# Patient Record
Sex: MALE | Race: WHITE | NOT HISPANIC OR LATINO
[De-identification: names, ages, dates, MRNs, and addresses within clinical notes are randomized per-mention and may not be internally consistent; named-entity substitution may affect disease eponyms.]

---

## 2020-09-11 PROBLEM — Z00.00 ENCOUNTER FOR PREVENTIVE HEALTH EXAMINATION: Status: ACTIVE | Noted: 2020-09-11

## 2020-09-14 ENCOUNTER — APPOINTMENT (OUTPATIENT)
Dept: PEDIATRIC ALLERGY IMMUNOLOGY | Facility: CLINIC | Age: 60
End: 2020-09-14
Payer: COMMERCIAL

## 2020-09-14 PROCEDURE — 90471 IMMUNIZATION ADMIN: CPT

## 2020-09-14 PROCEDURE — 90682 RIV4 VACC RECOMBINANT DNA IM: CPT

## 2020-09-14 RX ORDER — MONTELUKAST 10 MG/1
10 TABLET, FILM COATED ORAL
Refills: 0 | Status: ACTIVE | COMMUNITY

## 2020-09-14 RX ORDER — FLUTICASONE PROPIONATE 50 MCG
50 SPRAY, SUSPENSION NASAL
Refills: 0 | Status: ACTIVE | COMMUNITY

## 2020-09-14 RX ORDER — ROSUVASTATIN CALCIUM 10 MG/1
10 TABLET, FILM COATED ORAL
Refills: 0 | Status: ACTIVE | COMMUNITY

## 2020-10-27 ENCOUNTER — RX RENEWAL (OUTPATIENT)
Age: 60
End: 2020-10-27

## 2020-11-05 ENCOUNTER — APPOINTMENT (OUTPATIENT)
Dept: PEDIATRIC ALLERGY IMMUNOLOGY | Facility: CLINIC | Age: 60
End: 2020-11-05
Payer: COMMERCIAL

## 2020-11-05 VITALS — RESPIRATION RATE: 16 BRPM | HEART RATE: 55 BPM | OXYGEN SATURATION: 97 %

## 2020-11-05 DIAGNOSIS — Z23 ENCOUNTER FOR IMMUNIZATION: ICD-10-CM

## 2020-11-05 PROCEDURE — 99214 OFFICE O/P EST MOD 30 MIN: CPT | Mod: 25

## 2020-11-05 PROCEDURE — 99072 ADDL SUPL MATRL&STAF TM PHE: CPT

## 2020-11-05 NOTE — ASSESSMENT
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 Pt also with moderate persistent asthma and GERD\par \par Pt would like to consider starting Cuvitru for recurrent infections, Low IgG and poor responses to Pneumovax especially during period of COVID pandemic\par \par Will await results of Pneumococcal test and then apply for insurance coverage for Cuvitru\par \par Will follow up 4 weeks

## 2020-11-05 NOTE — PHYSICAL EXAM
[Alert] : alert [Well Nourished] : well nourished [Normal Pupil & Iris Size/Symmetry] : normal pupil and iris size and symmetry [No Discharge] : no discharge [Normal TMs] : both tympanic membranes were normal [No Thrush] : no thrush [Boggy Nasal Turbinates] : no boggy and/or pale nasal turbinates [Posterior Pharyngeal Cobblestoning] : no posterior pharyngeal cobblestoning [Normal Rate and Effort] : normal respiratory rhythm and effort [No Crackles] : no crackles [Wheezing] : no wheezing was heard [Normal Rate] : heart rate was normal  [Normal S1, S2] : normal S1 and S2 [Regular Rhythm] : with a regular rhythm [Normal Cervical Lymph Nodes] : cervical [Skin Intact] : skin intact  [No Rash] : no rash [Eczematous Patches] : no eczematous patches

## 2020-11-05 NOTE — SOCIAL HISTORY
[Spouse/Partner] : spouse/partner [House] : [unfilled] lives in a house  [Central Forced Air] : heating provided by central forced air [Central] : air conditioning provided by central unit [Humidifier] : uses a humidifier [Dehumidifier] : uses a dehumidifier [Dry] : dry [Bedroom] :  in bedroom [Basement] :  in basement  [Living Area] : in living area [Dog] : dog [] :  [FreeTextEntry1] : ELIZABETH [FreeTextEntry2] : retired [Dust Mite Covers] : does not have dust mite covers [Feather Pillows] : does not have feather pillows [Feather Comforter] : does not have a feather comforter [Smokers in Household] : there are no smokers in the home [de-identified] : fitness

## 2020-11-05 NOTE — REVIEW OF SYSTEMS
[Fatigue] : fatigue [Nasal Congestion] : nasal congestion [Post Nasal Drip] : post nasal drip [Cough] : cough [Wheezing] : wheezing [Heartburn] : heartburn [Recurrent Sinus Infections] : recurrent sinus infections [Nl] : Integumentary

## 2020-11-05 NOTE — HISTORY OF PRESENT ILLNESS
[de-identified] : 60 yr old with CVID now here for follow up. Pt has several year history of very low levels of IgG in the 300 range and generally has not been sick over those years but recently has noticed a slight increase number ofd sinus infections to 2-3x/year usually treated with antibiotics.  He has underlying moderate persistent asthma and is using Advair 50/500 1p bid along with Singulair 10 mg and albuterol MDI prn. He was recently diagnosed with GERD and LPR and is currently on Pepcid and Protonix. He is occasionally complains of hoarseness and sore throat and part of his complaints may be related to GERD/LPR and side effects of dysphonia from Advair. He is becoming increasing concerned, as am I , of him walking around with IgG levels this low without replacement therapy despite mild infection rate. he previously did not want to consider IVIG or SQIG but is now interested. \par Pt was last given Pneumovax 23 12/15 and made a fair to poor response post vaccination. Since then pneumococcal titers have again dropped to pre-vaccination levels - current level just checked 10/20 is still pending

## 2020-11-05 NOTE — REASON FOR VISIT
[Routine Follow-Up] : a routine follow-up visit for [Allergy Evaluation/ Skin Testing] : allergy evaluation and or skin testing [To Food] : allergy to food [Asthma] : asthma [Recurrent Infect] : recurrent infections [Immune Evaluation] : immune evaluation

## 2020-11-11 ENCOUNTER — APPOINTMENT (OUTPATIENT)
Dept: OTOLARYNGOLOGY | Facility: CLINIC | Age: 60
End: 2020-11-11
Payer: COMMERCIAL

## 2020-11-11 VITALS
HEIGHT: 71 IN | DIASTOLIC BLOOD PRESSURE: 83 MMHG | SYSTOLIC BLOOD PRESSURE: 143 MMHG | WEIGHT: 170 LBS | BODY MASS INDEX: 23.8 KG/M2 | TEMPERATURE: 97.6 F

## 2020-11-11 DIAGNOSIS — Z88.9 ALLERGY STATUS TO UNSPECIFIED DRUGS, MEDICAMENTS AND BIOLOGICAL SUBSTANCES: ICD-10-CM

## 2020-11-11 DIAGNOSIS — Z87.09 PERSONAL HISTORY OF OTHER DISEASES OF THE RESPIRATORY SYSTEM: ICD-10-CM

## 2020-11-11 DIAGNOSIS — Z80.0 FAMILY HISTORY OF MALIGNANT NEOPLASM OF DIGESTIVE ORGANS: ICD-10-CM

## 2020-11-11 DIAGNOSIS — Z80.1 FAMILY HISTORY OF MALIGNANT NEOPLASM OF TRACHEA, BRONCHUS AND LUNG: ICD-10-CM

## 2020-11-11 DIAGNOSIS — Z78.9 OTHER SPECIFIED HEALTH STATUS: ICD-10-CM

## 2020-11-11 PROCEDURE — 99072 ADDL SUPL MATRL&STAF TM PHE: CPT

## 2020-11-11 PROCEDURE — 99243 OFF/OP CNSLTJ NEW/EST LOW 30: CPT | Mod: 25

## 2020-11-11 PROCEDURE — 31575 DIAGNOSTIC LARYNGOSCOPY: CPT

## 2020-11-11 RX ORDER — ALBUTEROL SULFATE 90 UG/1
108 (90 BASE) INHALANT RESPIRATORY (INHALATION)
Qty: 1 | Refills: 3 | Status: DISCONTINUED | COMMUNITY
End: 2020-11-11

## 2020-11-11 NOTE — REASON FOR VISIT
[Initial Consultation] : an initial consultation for [FreeTextEntry2] : hoarse throat/sensation of need to clean throat

## 2020-11-11 NOTE — HISTORY OF PRESENT ILLNESS
[de-identified] : The patient presents with mild hoarseness and throat clearing. He notes that he had a virus in the Spring that he didn't need abx for and his sx resolved but he still had continued hoarseness and throat clearing intermittently. He started getting heartburn as well. He was prescribed Pantoprazole by his PCP. He then saw the GI who double the dose of Pantoprazole and prescribed Pepcid with improvement. He feels the hoarseness and throat clearing is most prevalent after eating. He is scheduled for an upper GI endoscopy and colonoscopy in 1/2020.

## 2020-11-11 NOTE — PROCEDURE
[de-identified] : Procedure:  Flexible Fiberoptic Laryngoscopy: Risks, benefits, and alternatives of flexible endoscopy were explained to the patient.  Specific mention was made of the risk of throat numbness.  The patient gave oral consent to proceed.  The nasal cavities were decongested and anesthetized with a combination of oxymetazoline and 4% lidocaine solution.  The flexible scope was inserted into the right nasal cavity and advanced towards the nasopharynx.  Visualized mucosa over the turbinates and deviated septum were as described above.  The nasopharynx had hypervascularity. Lymphoid tissue. Oropharyngeal walls were symmetric and mobile without lesion, mass, or edema.  Hypopharynx was also without  lesion or edema.  Larynx was mobile without lesions. Postcricoid edema. Pooling postcricoid and pyriforms. True vocal folds were white without mass or lesion. VC's move normally. Base of tongue was within normal limits. Thick secretions and mucus throughout.

## 2020-11-11 NOTE — PHYSICAL EXAM
[Hearing Gordillo Test (Tuning Fork On Forehead)] : no lateralization of tone [] : septum deviated to the left [Normal] : no rashes [FreeTextEntry8] : cerumen removed via curettage  [FreeTextEntry9] : cerumen removed via curettage  [de-identified] : inflamed and swollen [FreeTextEntry1] : elongated uvula, white coating to the tongue - doesn't scrape off, leukoplakia on the cheeks right greater than left [FreeTextEntry2] : sinuses nontender to percussion [de-identified] : sensations intact

## 2020-11-11 NOTE — CONSULT LETTER
[Dear  ___] : Dear  [unfilled], [Consult Letter:] : I had the pleasure of evaluating your patient, [unfilled]. [Please see my note below.] : Please see my note below. [Consult Closing:] : Thank you very much for allowing me to participate in the care of this patient.  If you have any questions, please do not hesitate to contact me. [Sincerely,] : Sincerely, [DrJami  ___] : Dr. GONZALEZ

## 2020-11-11 NOTE — ASSESSMENT
[FreeTextEntry1] : hoarseness\par throat clearing\par postcricoid edema\par pooling postcricoid and pyriforms\par thick secretions and mucus throughout\par \par Plan:\par Cerumen removed. Flexible laryngoscopy. Reflux diet- no eating 2 hours before bed. Continue reflux medication. FEEST. FU after test.

## 2020-11-11 NOTE — ADDENDUM
[FreeTextEntry1] : Documented by Kacy Perez acting as scribe for Dr. Nguyen on 11/11/2020.\par \par All Medical record entries made by the Scribe were at my, Dr. Nguyen, direction and personally dictated by me on 11/11/2020 . I have reviewed the chart and agree that the record accurately reflects my personal performance of the history, physical exam, assessment and plan. I have also personally directed, reviewed, and agreed with the discharge instructions.

## 2020-11-15 ENCOUNTER — NON-APPOINTMENT (OUTPATIENT)
Age: 60
End: 2020-11-15

## 2020-11-24 ENCOUNTER — APPOINTMENT (OUTPATIENT)
Dept: OTOLARYNGOLOGY | Facility: CLINIC | Age: 60
End: 2020-11-24
Payer: COMMERCIAL

## 2020-11-24 PROCEDURE — 92612 ENDOSCOPY SWALLOW (FEES) VID: CPT | Mod: GN

## 2020-12-08 ENCOUNTER — APPOINTMENT (OUTPATIENT)
Dept: OTOLARYNGOLOGY | Facility: CLINIC | Age: 60
End: 2020-12-08
Payer: COMMERCIAL

## 2020-12-08 VITALS
TEMPERATURE: 97.5 F | DIASTOLIC BLOOD PRESSURE: 80 MMHG | BODY MASS INDEX: 23.8 KG/M2 | WEIGHT: 170 LBS | SYSTOLIC BLOOD PRESSURE: 140 MMHG | HEIGHT: 71 IN

## 2020-12-08 PROCEDURE — 99072 ADDL SUPL MATRL&STAF TM PHE: CPT

## 2020-12-08 PROCEDURE — 99214 OFFICE O/P EST MOD 30 MIN: CPT

## 2020-12-08 NOTE — HISTORY OF PRESENT ILLNESS
[de-identified] : The patient presents with h/o hoarseness, throat clearing and reflux. Patient has symptoms right after eating, talking on the phone for a long period of time. He is following the reflux diet and taking reflux medication.  Patient presents today for results. Denies dysphasia or globus sensation.

## 2020-12-08 NOTE — CONSULT LETTER
[Dear  ___] : Dear  [unfilled], [Courtesy Letter:] : I had the pleasure of seeing your patient, [unfilled], in my office today. [Please see my note below.] : Please see my note below. [Consult Closing:] : Thank you very much for allowing me to participate in the care of this patient.  If you have any questions, please do not hesitate to contact me. [Sincerely,] : Sincerely, [FreeTextEntry3] : Silvino Nguyen MD FACS

## 2020-12-08 NOTE — ASSESSMENT
[FreeTextEntry1] :  h/o hoarseness, throat clearing and reflux\par \par FEEST 11/24/2020: reflux was identified with each PO trial with return of green material to the level in the cricopharyngeus. \par \par Plan:  FU with GI for upper GI endoscopy. Advised to consume small bites/sips. Recommended speech therapy. Discussed r/b/a of Esophagram.  Continue reflux diet and medication.

## 2020-12-08 NOTE — ADDENDUM
[FreeTextEntry1] : Documented by Kacy Perez acting as scribe for Dr. Nguyen on 12/08/2020.\par \par All Medical record entries made by the Scribe were at my, Dr. Nguyen, direction and personally dictated by me on 12/08/2020 . I have reviewed the chart and agree that the record accurately reflects my personal performance of the history, physical exam, assessment and plan. I have also personally directed, reviewed, and agreed with the discharge instructions.

## 2020-12-23 ENCOUNTER — NON-APPOINTMENT (OUTPATIENT)
Age: 60
End: 2020-12-23

## 2020-12-24 ENCOUNTER — TRANSCRIPTION ENCOUNTER (OUTPATIENT)
Age: 60
End: 2020-12-24

## 2021-01-20 ENCOUNTER — APPOINTMENT (OUTPATIENT)
Dept: OTOLARYNGOLOGY | Facility: CLINIC | Age: 61
End: 2021-01-20
Payer: COMMERCIAL

## 2021-01-20 VITALS
TEMPERATURE: 97.2 F | HEIGHT: 71 IN | DIASTOLIC BLOOD PRESSURE: 73 MMHG | HEART RATE: 60 BPM | SYSTOLIC BLOOD PRESSURE: 138 MMHG | WEIGHT: 168 LBS | BODY MASS INDEX: 23.52 KG/M2

## 2021-01-20 PROCEDURE — 99072 ADDL SUPL MATRL&STAF TM PHE: CPT

## 2021-01-20 PROCEDURE — 99213 OFFICE O/P EST LOW 20 MIN: CPT

## 2021-01-20 RX ORDER — FEXOFENADINE HCL 180 MG
180 TABLET ORAL
Refills: 0 | Status: COMPLETED | COMMUNITY
End: 2021-01-20

## 2021-01-20 NOTE — ASSESSMENT
[FreeTextEntry1] : Reviewed and reconciled medications, allergies, PMHx, PSHx, SocHx, FMHx.\par \par h/o hoarseness, throat clearing and reflux - some improvement, continued hoarseness mostly in the morning and after eating and drinking\par \par Plan:\par Discussed r/b/a of Domperidone. Drink as you eat. Eat slowly. Continue reflux diet. Continue reflux medications. FU 3 months.

## 2021-01-20 NOTE — ADDENDUM
[FreeTextEntry1] : Documented by Kacy Perez acting as scribe for Dr. Nguyen on 01/20/2021.\par \par All Medical record entries made by the Scribe were at my, Dr. Nguyen, direction and personally dictated by me on 01/20/2021 . I have reviewed the chart and agree that the record accurately reflects my personal performance of the history, physical exam, assessment and plan. I have also personally directed, reviewed, and agreed with the discharge instructions.

## 2021-01-20 NOTE — HISTORY OF PRESENT ILLNESS
[de-identified] : The patient presents with h/o hoarseness, throat clearing and reflux. Pt states that his throat feels slightly better. He had an upper GI endoscopy and did a bx that were both negative. He found a polyp in the colon that was benign. He has been taking the medication and his heartburn is less frequent. He goes for a longer span of time with no sx. He is trying to follow the diet but is finding it hard. He has lost some weight recently. He is clearing his throat less frequently. When he is hoarse it's instantly after eating or drinking something and feels it's more so in the morning. He gets heartburn more at nighttime. He also notes hoarseness if he is doing a lot of talking. He is drinking a lot of water.

## 2021-02-08 ENCOUNTER — NON-APPOINTMENT (OUTPATIENT)
Age: 61
End: 2021-02-08

## 2021-03-11 ENCOUNTER — RX RENEWAL (OUTPATIENT)
Age: 61
End: 2021-03-11

## 2021-04-01 LAB
ALBUMIN SERPL ELPH-MCNC: 4.5 G/DL
ALP BLD-CCNC: 73 U/L
ALT SERPL-CCNC: 48 U/L
ANION GAP SERPL CALC-SCNC: 10 MMOL/L
AST SERPL-CCNC: 34 U/L
BASOPHILS # BLD AUTO: 0.01 K/UL
BASOPHILS NFR BLD AUTO: 0.2 %
BILIRUB SERPL-MCNC: 0.5 MG/DL
BUN SERPL-MCNC: 11 MG/DL
CALCIUM SERPL-MCNC: 9.5 MG/DL
CHLORIDE SERPL-SCNC: 101 MMOL/L
CO2 SERPL-SCNC: 29 MMOL/L
CREAT SERPL-MCNC: 0.96 MG/DL
EOSINOPHIL # BLD AUTO: 0.04 K/UL
EOSINOPHIL NFR BLD AUTO: 0.9 %
GLUCOSE SERPL-MCNC: 71 MG/DL
HCT VFR BLD CALC: 44.2 %
HGB BLD-MCNC: 14.1 G/DL
IMM GRANULOCYTES NFR BLD AUTO: 0.2 %
LYMPHOCYTES # BLD AUTO: 1.15 K/UL
LYMPHOCYTES NFR BLD AUTO: 25.3 %
MAN DIFF?: NORMAL
MCHC RBC-ENTMCNC: 31.1 PG
MCHC RBC-ENTMCNC: 31.9 GM/DL
MCV RBC AUTO: 97.6 FL
MONOCYTES # BLD AUTO: 0.58 K/UL
MONOCYTES NFR BLD AUTO: 12.8 %
NEUTROPHILS # BLD AUTO: 2.75 K/UL
NEUTROPHILS NFR BLD AUTO: 60.6 %
PLATELET # BLD AUTO: 124 K/UL
POTASSIUM SERPL-SCNC: 4.3 MMOL/L
PROT SERPL-MCNC: 6.7 G/DL
RBC # BLD: 4.53 M/UL
RBC # FLD: 12.2 %
SODIUM SERPL-SCNC: 140 MMOL/L
WBC # FLD AUTO: 4.54 K/UL

## 2021-04-03 LAB
DEPRECATED KAPPA LC FREE/LAMBDA SER: 0.92 RATIO
IGA SER QL IEP: 50 MG/DL
IGG SER QL IEP: 821 MG/DL
IGM SER QL IEP: 55 MG/DL
KAPPA LC CSF-MCNC: 0.64 MG/DL
KAPPA LC SERPL-MCNC: 0.59 MG/DL

## 2021-04-13 ENCOUNTER — APPOINTMENT (OUTPATIENT)
Dept: PEDIATRIC ALLERGY IMMUNOLOGY | Facility: CLINIC | Age: 61
End: 2021-04-13
Payer: COMMERCIAL

## 2021-04-13 VITALS — HEART RATE: 56 BPM | OXYGEN SATURATION: 97 % | RESPIRATION RATE: 16 BRPM

## 2021-04-13 PROCEDURE — 99214 OFFICE O/P EST MOD 30 MIN: CPT

## 2021-04-13 PROCEDURE — 99072 ADDL SUPL MATRL&STAF TM PHE: CPT

## 2021-04-13 NOTE — ASSESSMENT
[FreeTextEntry1] : 61 yr old with CVID now on Hizentra for past 5 infusions with nice response and IgG increase from 347 to 821 without signficant side effects\par \par Discussed all issues related to infusions\par Discussed responses to COVID vaccines in CVID patients - what little is known\par Discussed various protocols for returning to social interactions\par \par Will follow up 3-4 months\par \par \par \par Total MD time spent on this encounter was 37 minutes.  This includes time devoted to preparing to see the patient with review of previous medical record, obtaining medical history, performing physical exam, counseling and patient education with patient and family, ordering medications and lab studies, documentation in the medical record and coordination of care.\par

## 2021-04-13 NOTE — SOCIAL HISTORY
[Spouse/Partner] : spouse/partner [House] : [unfilled] lives in a house  [Central Forced Air] : heating provided by central forced air [Central] : air conditioning provided by central unit [Humidifier] : uses a humidifier [Dehumidifier] : uses a dehumidifier [Dry] : dry [Bedroom] :  in bedroom [Basement] :  in basement  [Living Area] : in living area [Dog] : dog [] :  [FreeTextEntry1] : ELIZABETH [FreeTextEntry2] : retired [Dust Mite Covers] : does not have dust mite covers [Feather Pillows] : does not have feather pillows [Feather Comforter] : does not have a feather comforter [Smokers in Household] : there are no smokers in the home [de-identified] : fitness

## 2021-04-13 NOTE — HISTORY OF PRESENT ILLNESS
[de-identified] : 60 yr old with CVID now here for follow up. Pt had several year history of very low levels of IgG in the 300 range and generally has not been sick over those years but recently had noticed a slight increase number ofd sinus infections to 2-3x/year usually treated with antibiotics.  He has underlying moderate persistent asthma and is using Advair 50/500 1p bid along with Singulair 10 mg and albuterol MDI prn. He was recently diagnosed with GERD and LPR and is currently on Pepcid and Protonix.   \par Pt was last given Pneumovax 23 12/15 and made a fair to poor response post vaccination. Since then pneumococcal titers have again dropped to pre-vaccination levels\par After much deliberation he was stated on Hizentra 15 gms q 2 weeks SQ (400 mg/kg per month)- He is now doing the infusions himself-receives 75 ml dividend into 3 sites that takes 1-1.5 hrs to infuse. He is tolerating very well.\par He is now feeling better with more energy. IgG levels have climbed form 347 now to trough of 821 with IgA 50. Normal CBC except for minimal low PLT count which has been low for awhile.\par He received two COVID vaccines and has many questions about continuing his quarantine.

## 2021-04-20 ENCOUNTER — APPOINTMENT (OUTPATIENT)
Dept: OTOLARYNGOLOGY | Facility: CLINIC | Age: 61
End: 2021-04-20
Payer: COMMERCIAL

## 2021-04-20 VITALS
HEART RATE: 64 BPM | BODY MASS INDEX: 23.1 KG/M2 | DIASTOLIC BLOOD PRESSURE: 78 MMHG | SYSTOLIC BLOOD PRESSURE: 140 MMHG | TEMPERATURE: 97.1 F | HEIGHT: 71 IN | WEIGHT: 165 LBS

## 2021-04-20 PROCEDURE — 99213 OFFICE O/P EST LOW 20 MIN: CPT | Mod: 25

## 2021-04-20 PROCEDURE — 31575 DIAGNOSTIC LARYNGOSCOPY: CPT

## 2021-04-20 PROCEDURE — 99072 ADDL SUPL MATRL&STAF TM PHE: CPT

## 2021-04-20 RX ORDER — ROSUVASTATIN CALCIUM 5 MG/1
5 TABLET, FILM COATED ORAL
Refills: 0 | Status: DISCONTINUED | COMMUNITY
End: 2021-04-20

## 2021-04-20 RX ORDER — PANTOPRAZOLE SODIUM 40 MG/1
40 TABLET, DELAYED RELEASE ORAL
Refills: 0 | Status: COMPLETED | COMMUNITY
End: 2021-04-20

## 2021-04-20 NOTE — PHYSICAL EXAM
[Normal] : no rashes [FreeTextEntry1] : mucus on the uvula, PND, leukoplakia on the cheeks [FreeTextEntry2] : sinuses nontender to percussion [de-identified] : sensations intact

## 2021-04-20 NOTE — PROCEDURE
[de-identified] : Procedure:  Flexible Fiberoptic Laryngoscopy: Risks, benefits, and alternatives of flexible endoscopy were explained to the patient.  The patient gave oral consent to proceed.  The flexible scope was inserted into the right nasal cavity and advanced towards the nasopharynx.  Visualized mucosa over the turbinates and septum were as described above.  The nasopharynx was clear.  Oropharyngeal walls were symmetric and mobile without lesion, mass, or edema.  Hypopharynx was also without  lesion or edema.  Larynx was mobile without lesions. Improvement - much less swelling and redness. No pooling. True vocal folds were white without mass or lesion.  Base of tongue was within normal limits.

## 2021-04-20 NOTE — CONSULT LETTER
[Courtesy Letter:] : I had the pleasure of seeing your patient, [unfilled], in my office today. [Please see my note below.] : Please see my note below. [Consult Closing:] : Thank you very much for allowing me to participate in the care of this patient.  If you have any questions, please do not hesitate to contact me. [Sincerely,] : Sincerely, [Dear  ___] : Dear  [unfilled], [FreeTextEntry3] : Silvino Nguyen MD FACS

## 2021-04-20 NOTE — ASSESSMENT
[FreeTextEntry1] : Reviewed and reconciled medications, allergies, PMHx, PSHx, SocHx, FMHx.\par \par h/o hoarseness, throat clearing and reflux - improvement, much less swelling and redness\par \par Plan:\par Flexible laryngoscopy. Continue Protonix and Pepcid. Continue reflux diet. FU 3-4 months.

## 2021-04-20 NOTE — ADDENDUM
[FreeTextEntry1] : Documented by Kacy Perez acting as scribe for Dr. Nguyen on 04/20/2021.\par \par All Medical record entries made by the Scribe were at my, Dr. Nguyen, direction and personally dictated by me on 04/20/2021 . I have reviewed the chart and agree that the record accurately reflects my personal performance of the history, physical exam, assessment and plan. I have also personally directed, reviewed, and agreed with the discharge instructions.

## 2021-04-20 NOTE — HISTORY OF PRESENT ILLNESS
[de-identified] : The patient presents with h/o hoarseness, throat clearing and reflux. Patient reports doing well with reflux tx. Pt cut back is dose of Protonix and d/c Pepcid and was doing well but his sx returned with cheating on the diet.

## 2021-05-17 ENCOUNTER — RX RENEWAL (OUTPATIENT)
Age: 61
End: 2021-05-17

## 2021-06-28 LAB
ALBUMIN SERPL ELPH-MCNC: 4.3 G/DL
ALP BLD-CCNC: 72 U/L
ALT SERPL-CCNC: 54 U/L
ANION GAP SERPL CALC-SCNC: 9 MMOL/L
AST SERPL-CCNC: 37 U/L
BASOPHILS # BLD AUTO: 0.02 K/UL
BASOPHILS NFR BLD AUTO: 0.4 %
BILIRUB SERPL-MCNC: 0.8 MG/DL
BUN SERPL-MCNC: 15 MG/DL
CALCIUM SERPL-MCNC: 9.7 MG/DL
CHLORIDE SERPL-SCNC: 100 MMOL/L
CO2 SERPL-SCNC: 30 MMOL/L
CREAT SERPL-MCNC: 0.99 MG/DL
DEPRECATED KAPPA LC FREE/LAMBDA SER: 1.82 RATIO
EOSINOPHIL # BLD AUTO: 0.03 K/UL
EOSINOPHIL NFR BLD AUTO: 0.6 %
GLUCOSE SERPL-MCNC: 123 MG/DL
HCT VFR BLD CALC: 42.9 %
HGB BLD-MCNC: 14 G/DL
IGA SER QL IEP: 46 MG/DL
IGG SER QL IEP: 888 MG/DL
IGM SER QL IEP: 48 MG/DL
IMM GRANULOCYTES NFR BLD AUTO: 0.2 %
KAPPA LC CSF-MCNC: 0.34 MG/DL
KAPPA LC SERPL-MCNC: 0.62 MG/DL
LYMPHOCYTES # BLD AUTO: 1.42 K/UL
LYMPHOCYTES NFR BLD AUTO: 27.2 %
MAN DIFF?: NORMAL
MCHC RBC-ENTMCNC: 31.6 PG
MCHC RBC-ENTMCNC: 32.6 GM/DL
MCV RBC AUTO: 96.8 FL
MONOCYTES # BLD AUTO: 0.48 K/UL
MONOCYTES NFR BLD AUTO: 9.2 %
NEUTROPHILS # BLD AUTO: 3.26 K/UL
NEUTROPHILS NFR BLD AUTO: 62.4 %
PLATELET # BLD AUTO: 131 K/UL
POTASSIUM SERPL-SCNC: 4.6 MMOL/L
PROT SERPL-MCNC: 6.5 G/DL
RBC # BLD: 4.43 M/UL
RBC # FLD: 12.2 %
SODIUM SERPL-SCNC: 139 MMOL/L
WBC # FLD AUTO: 5.22 K/UL

## 2021-06-30 ENCOUNTER — NON-APPOINTMENT (OUTPATIENT)
Age: 61
End: 2021-06-30

## 2021-07-06 ENCOUNTER — APPOINTMENT (OUTPATIENT)
Dept: PEDIATRIC ALLERGY IMMUNOLOGY | Facility: CLINIC | Age: 61
End: 2021-07-06
Payer: COMMERCIAL

## 2021-07-06 PROCEDURE — 99072 ADDL SUPL MATRL&STAF TM PHE: CPT

## 2021-07-06 PROCEDURE — 99214 OFFICE O/P EST MOD 30 MIN: CPT

## 2021-07-06 NOTE — HISTORY OF PRESENT ILLNESS
[de-identified] : 61 yr old with CVID now here for follow up. Pt had several year history of very low levels of IgG in the 300 range and generally has not been sick over those years but recently had noticed a slight increase number ofd sinus infections to 2-3x/year usually treated with antibiotics.  He has underlying moderate persistent asthma and is using Advair 50/500 1p bid along with Singulair 10 mg and albuterol MDI prn. He was recently diagnosed with GERD and LPR and is currently on Pepcid and Protonix.   \par \par After much deliberation he was stated on Hizentra 15 gms q 2 weeks SQ (400 mg/kg per month)- He is now doing the infusions himself-receives 75 ml dividend into 3 sites that takes 1 -hrs to infuse. He is tolerating very well. He has been on infusions since Jan now for 6 months\par He is now feeling better with more energy. IgG levels have climbed form 347 now to trough of 888 with IgA 46. Normal CBC except for minimal low PLT count which has been low for awhile.\par He received two COVID vaccines - his primary care MD did recently check his COVID spike Ab (Roche) and he had high protective levels >250\par He is feeling great and has more energy\par He remains on his Singulair, Advair and Protonix

## 2021-07-06 NOTE — PHYSICAL EXAM
[Alert] : alert [Well Nourished] : well nourished [No Discharge] : no discharge [Normal TMs] : both tympanic membranes were normal [No Thrush] : no thrush [Boggy Nasal Turbinates] : no boggy and/or pale nasal turbinates [Posterior Pharyngeal Cobblestoning] : no posterior pharyngeal cobblestoning [Normal Rate and Effort] : normal respiratory rhythm and effort [Wheezing] : no wheezing was heard [Normal Rate] : heart rate was normal  [Normal Cervical Lymph Nodes] : cervical

## 2021-07-06 NOTE — ASSESSMENT
[FreeTextEntry1] : 61 yr old with CVID on Hizentra 15 gms q 2weeks with good IgG trough levels >800 with no infections or antibiotic use - Good COVID vaccine response (likely his and not in SQIg product) to mRNA vaccine\par \par Suggest continuation of Hizentra at current interval for now\par Discuss relaxation of mask use when around small groups of people who are all vaccinated\par \par Follow up 6 months\par \par Total MD time spent on this encounter was 30 minutes.  This includes time devoted to preparing to see the patient with review of previous medical record, obtaining medical history, performing physical exam, counseling and patient education with patient and family, ordering medications and lab studies, documentation in the medical record and coordination of care.\par

## 2021-08-10 ENCOUNTER — NON-APPOINTMENT (OUTPATIENT)
Age: 61
End: 2021-08-10

## 2021-09-01 ENCOUNTER — APPOINTMENT (OUTPATIENT)
Dept: OTOLARYNGOLOGY | Facility: CLINIC | Age: 61
End: 2021-09-01
Payer: COMMERCIAL

## 2021-09-01 VITALS
HEART RATE: 52 BPM | DIASTOLIC BLOOD PRESSURE: 80 MMHG | BODY MASS INDEX: 23.1 KG/M2 | HEIGHT: 71 IN | WEIGHT: 165 LBS | SYSTOLIC BLOOD PRESSURE: 138 MMHG

## 2021-09-01 PROCEDURE — 99214 OFFICE O/P EST MOD 30 MIN: CPT | Mod: 25

## 2021-09-01 PROCEDURE — 31575 DIAGNOSTIC LARYNGOSCOPY: CPT

## 2021-09-01 RX ORDER — FAMOTIDINE 20 MG/1
20 TABLET, FILM COATED ORAL
Refills: 0 | Status: DISCONTINUED | COMMUNITY
End: 2021-09-01

## 2021-09-01 RX ORDER — PANTOPRAZOLE 20 MG/1
20 TABLET, DELAYED RELEASE ORAL
Refills: 0 | Status: DISCONTINUED | COMMUNITY
End: 2021-09-01

## 2021-09-01 NOTE — HISTORY OF PRESENT ILLNESS
[de-identified] : The patient presents with h/o hoarseness, throat clearing and reflux. Pt last seen on 4/20. Pt sees allergist Dr. Weir. Pt reports his throat's doing better, but still experiencing heartburn 1-2x per month. Pt notes experiencing stress which worsens symptoms. Pt adds no voice impairment. Pt states he takes a calcium supplement, has previously taken Pepcid.

## 2021-09-01 NOTE — PROCEDURE
[FreeTextEntry6] : Procedure: Flexible Fiberoptic Laryngoscopy: Risk, benefits and alternatives of flexible endoscopy were explained to the patient. The patient gave oral consent to proceed. The flexible scope was inserted into the right nasal cavity and advanced towards the nasopharynx. Visualized mucosa over the turbinates and septum were as described above. The nasopharynx was clear. Oropharyngeal walls were symmetric and mobile without lesion, mass or edema. Hypopharynx was also without lesion or edema. Larynx was mobile without lesions. Mild edema of postcricoid region. Arytenoid region showed mild erythema and swelling. Supraglottic structures were free of mass and asymmetry. True vocal folds were white without mass or lesion. Base of tongue was within normal limits.

## 2021-09-01 NOTE — PHYSICAL EXAM
[] : septum deviated to the left [Midline] : trachea is located in midline position [Clear / Open well] : hypopharynx is clear and opens well [Normal] : no masses and lesions seen, face is symmetric [FreeTextEntry6] : Cerumen removed via curettage  [FreeTextEntry7] : Cerumen removed via curettage  [FreeTextEntry5] : No lateralization to tuning forks.  [FreeTextEntry1] : elongated uvula, geographic tongue. [FreeTextEntry2] : Sinuses nontender to percussion. sensations intact.

## 2021-09-01 NOTE — ASSESSMENT
[FreeTextEntry1] : Reviewed and reconciled medications, allergies, PMHx, PSHx, SocHx, FMHx. \par \par Right and left ear - Cerumen removed via curettage \par Sinuses nontender to percussion. sensations intact. \par No lateralization to tuning forks. \par Left deviated septum\par Mildly inflamed turbinates\par \par Plan: Discussed taking Pepcid 20mg prn before certain meals. Continue reflux diet. Take reflux med Protonix 20mg or 40 mg QAM, pre-breakfast. FU 6 months.

## 2021-09-01 NOTE — ADDENDUM
[FreeTextEntry1] : Documented by Skinny Shook acting as a scribe for Dr. Silvino Nguyen on (09/01/2021).\par \par All medical record entries made by the Scribe were at my, Dr. Silvino Nguyen's, direction and personally dictated by me on (09/01/2021). I have reviewed the chart and agree that the record accurately reflects my personal performance of the history, physical exam, assessment and plan. I have also personally directed, reviewed, and agree with the discharge instructions.

## 2021-09-30 ENCOUNTER — RX RENEWAL (OUTPATIENT)
Age: 61
End: 2021-09-30

## 2021-10-12 ENCOUNTER — APPOINTMENT (OUTPATIENT)
Dept: PEDIATRIC ALLERGY IMMUNOLOGY | Facility: CLINIC | Age: 61
End: 2021-10-12
Payer: COMMERCIAL

## 2021-10-12 VITALS — HEART RATE: 57 BPM | RESPIRATION RATE: 16 BRPM | OXYGEN SATURATION: 94 % | TEMPERATURE: 98.7 F

## 2021-10-12 PROCEDURE — 90471 IMMUNIZATION ADMIN: CPT

## 2021-10-12 PROCEDURE — 90682 RIV4 VACC RECOMBINANT DNA IM: CPT

## 2021-10-25 ENCOUNTER — NON-APPOINTMENT (OUTPATIENT)
Age: 61
End: 2021-10-25

## 2021-12-15 ENCOUNTER — RX RENEWAL (OUTPATIENT)
Age: 61
End: 2021-12-15

## 2021-12-15 RX ORDER — PANTOPRAZOLE 20 MG/1
20 TABLET, DELAYED RELEASE ORAL
Qty: 90 | Refills: 3 | Status: ACTIVE | COMMUNITY
Start: 2021-09-01 | End: 1900-01-01

## 2021-12-16 ENCOUNTER — RX RENEWAL (OUTPATIENT)
Age: 61
End: 2021-12-16

## 2022-01-17 ENCOUNTER — RX RENEWAL (OUTPATIENT)
Age: 62
End: 2022-01-17

## 2022-02-02 LAB
ALBUMIN SERPL ELPH-MCNC: 4.4 G/DL
ALP BLD-CCNC: 64 U/L
ALT SERPL-CCNC: 29 U/L
ANION GAP SERPL CALC-SCNC: 11 MMOL/L
AST SERPL-CCNC: 29 U/L
BASOPHILS # BLD AUTO: 0.02 K/UL
BASOPHILS NFR BLD AUTO: 0.4 %
BILIRUB SERPL-MCNC: 0.5 MG/DL
BUN SERPL-MCNC: 13 MG/DL
CALCIUM SERPL-MCNC: 9.4 MG/DL
CHLORIDE SERPL-SCNC: 100 MMOL/L
CO2 SERPL-SCNC: 29 MMOL/L
CREAT SERPL-MCNC: 0.96 MG/DL
EOSINOPHIL # BLD AUTO: 0.04 K/UL
EOSINOPHIL NFR BLD AUTO: 0.8 %
GLUCOSE SERPL-MCNC: 94 MG/DL
HCT VFR BLD CALC: 43 %
HGB BLD-MCNC: 13.8 G/DL
IMM GRANULOCYTES NFR BLD AUTO: 0.2 %
LYMPHOCYTES # BLD AUTO: 1.23 K/UL
LYMPHOCYTES NFR BLD AUTO: 25.4 %
MAN DIFF?: NORMAL
MCHC RBC-ENTMCNC: 30.7 PG
MCHC RBC-ENTMCNC: 32.1 GM/DL
MCV RBC AUTO: 95.6 FL
MONOCYTES # BLD AUTO: 0.52 K/UL
MONOCYTES NFR BLD AUTO: 10.7 %
NEUTROPHILS # BLD AUTO: 3.03 K/UL
NEUTROPHILS NFR BLD AUTO: 62.5 %
PLATELET # BLD AUTO: 134 K/UL
POTASSIUM SERPL-SCNC: 4.2 MMOL/L
PROT SERPL-MCNC: 6.4 G/DL
RBC # BLD: 4.5 M/UL
RBC # FLD: 12.6 %
SODIUM SERPL-SCNC: 140 MMOL/L
WBC # FLD AUTO: 4.85 K/UL

## 2022-02-03 LAB
DEPRECATED KAPPA LC FREE/LAMBDA SER: 2.03 RATIO
IGA SER QL IEP: 41 MG/DL
IGG SER QL IEP: 979 MG/DL
IGM SER QL IEP: 41 MG/DL
KAPPA LC CSF-MCNC: 0.31 MG/DL
KAPPA LC SERPL-MCNC: 0.63 MG/DL

## 2022-02-06 ENCOUNTER — NON-APPOINTMENT (OUTPATIENT)
Age: 62
End: 2022-02-06

## 2022-02-15 ENCOUNTER — APPOINTMENT (OUTPATIENT)
Dept: PEDIATRIC ALLERGY IMMUNOLOGY | Facility: CLINIC | Age: 62
End: 2022-02-15
Payer: COMMERCIAL

## 2022-02-15 VITALS
OXYGEN SATURATION: 99 % | BODY MASS INDEX: 24.27 KG/M2 | WEIGHT: 174 LBS | DIASTOLIC BLOOD PRESSURE: 72 MMHG | TEMPERATURE: 97.4 F | HEART RATE: 52 BPM | SYSTOLIC BLOOD PRESSURE: 137 MMHG | RESPIRATION RATE: 16 BRPM

## 2022-02-15 PROCEDURE — 99214 OFFICE O/P EST MOD 30 MIN: CPT

## 2022-02-15 NOTE — REVIEW OF SYSTEMS
[Recurrent Sinus Infections] : recurrent sinus infections [Recurrent Throat Infections] : no recurrence of throat infections [Recurrent Bronchitis] : no recurrent bronchitis [Recurrent Ear Infections] : no recurrence or ear infections [Recurrent Skin Infections] : no recurrent skin infections [Recurrent Pneumonia] : no ~T recurrent pneumonia [Nl] : Integumentary

## 2022-02-15 NOTE — HISTORY OF PRESENT ILLNESS
[de-identified] : 61 yr old with CVID now here for follow up. Pt had several year history of very low levels of IgG in the 300 range and generally has not been sick over those years but recently had noticed a slight increase number ofd sinus infections to 2-3x/year usually treated with antibiotics.  He has underlying moderate persistent asthma and is using Advair 50/500 1p bid along with Singulair 10 mg and albuterol MDI prn. He was recently diagnosed with GERD and LPR and is currently on Pepcid and Protonix.   \par \par After much deliberation he was stated on Hizentra 15 gms q 2 weeks SQ (400 mg/kg per month)- He is now doing the infusions himself-receives 75 ml dividend into 3 sites that takes 1 -hrs to infuse. He is tolerating very well. He has been on infusions since Jan now for 6 months\par He is now feeling better with more energy. IgG levels have climbed form 347 now to trough of 888 with IgA 46. Normal CBC except for minimal low PLT count which has been low for awhile.\par He received two COVID vaccines - his primary care MD did recently check his COVID spike Ab (Roche) and he had high protective levels >250\par \par He remains on his Singulair, Advair and Protonix \par \par He is now here for follow up and doing very well. Trough IgG are now at 979 - has had two sinus infections last years - both needed Levaquin and short courses of oral steroids 8/21 and 10/21 but other wise feels well\par No side effects of Hizentra

## 2022-02-15 NOTE — ASSESSMENT
[FreeTextEntry1] : 61 yr old with CVID doing very well on Hizentra at 15 gms q2 weeks with two recent sinus infections.\par Will continue to observe and consider increase in Hizentra dose if more infections are noted this late winter and spring\par \par Discuss CVID in general with patient and discuss use of sQIG\par \par Will follow up 6 months\par \par Total MD time spent on this encounter was 30 minutes.  This includes time devoted to preparing to see the patient with review of previous medical record, obtaining medical history, performing physical exam, counseling and patient education with patient and family, ordering medications and lab studies, documentation in the medical record and coordination of care.\par

## 2022-02-15 NOTE — SOCIAL HISTORY
[Spouse/Partner] : spouse/partner [House] : [unfilled] lives in a house  [Central Forced Air] : heating provided by central forced air [Central] : air conditioning provided by central unit [Humidifier] : uses a humidifier [Dehumidifier] : uses a dehumidifier [Dry] : dry [Bedroom] :  in bedroom [Basement] :  in basement  [Living Area] : in living area [Dog] : dog [] :  [FreeTextEntry1] : ELIZABETH [FreeTextEntry2] : retired [Dust Mite Covers] : does not have dust mite covers [Feather Pillows] : does not have feather pillows [Feather Comforter] : does not have a feather comforter [Smokers in Household] : there are no smokers in the home [de-identified] : fitness

## 2022-03-01 ENCOUNTER — RESULT REVIEW (OUTPATIENT)
Age: 62
End: 2022-03-01

## 2022-03-01 ENCOUNTER — APPOINTMENT (OUTPATIENT)
Dept: OTOLARYNGOLOGY | Facility: CLINIC | Age: 62
End: 2022-03-01
Payer: COMMERCIAL

## 2022-03-01 VITALS
SYSTOLIC BLOOD PRESSURE: 140 MMHG | WEIGHT: 165 LBS | DIASTOLIC BLOOD PRESSURE: 83 MMHG | HEIGHT: 71 IN | BODY MASS INDEX: 23.1 KG/M2 | HEART RATE: 48 BPM

## 2022-03-01 PROCEDURE — 70486 CT MAXILLOFACIAL W/O DYE: CPT

## 2022-03-01 PROCEDURE — 31231 NASAL ENDOSCOPY DX: CPT

## 2022-03-01 PROCEDURE — 99214 OFFICE O/P EST MOD 30 MIN: CPT | Mod: 25

## 2022-03-01 NOTE — HISTORY OF PRESENT ILLNESS
[de-identified] : The patient presents with h/o hoarseness, throat clearing and reflux- on Pantoprazole and reflux diet. Pt presents today for a follow up on his reflux and discuss his sinus symptoms. Pt has had no heartburn and very slight LPR in the morning but dissipates soon afterwards. However, pt reports that the LPR does not cause him any discomfort. Adds that whatever the first thing he drinks or eats in the morning usually does come back up. Pt additionally reports that two times in 3 months during the fall, he had sinus inflammation. Pt usually takes an oral steroid which resolves the inflammation. Pt reports that prior CT sinus showed polyps in his sinuses.

## 2022-03-01 NOTE — PHYSICAL EXAM
[Hearing Gordillo Test (Tuning Fork On Forehead)] : no lateralization of tone [Midline] : trachea located in midline position [Normal] : no masses and lesions seen, face is symmetric [FreeTextEntry8] : Cerumen removed via curettage.  [FreeTextEntry1] : minimal deviated septum- R > L. inflamed turbinates [de-identified] : geographic tongue  [de-identified] : very elongated uvula laying on the back of the tongue [FreeTextEntry2] : Sinuses nontender to percussion. Sensations intact.

## 2022-03-01 NOTE — PROCEDURE
[Recalcitrant Symptoms] : recalcitrant symptoms  [Anterior rhinoscopy insufficient to account for symptoms] : anterior rhinoscopy insufficient to account for symptoms [None] : none [Flexible Endoscope] : examined with the flexible endoscope [FreeTextEntry6] : Procedure: Flexible Nasal Endoscopy with a Look at the Larynx: Risks, benefits, and alternatives of flexible endoscopy were explained to the patient. The patient gave oral consent to proceed. The flexible scope was inserted into the left nasal cavity, then the right nasal cavity. Endoscopy of the inferior and middle meatus was performed. Left side with superiorly deviated septum, large middle turb. Right side with deviated septum, open maxillary sinus ostium.  No polyp, mass, or lesion was appreciated. Olfactory cleft was clear. Spheno-ethmoid recess clear. Nasopharynx was clear. Turbinates were without mass. Oropharyngeal walls were symmetric and mobile without lesion, mass, or edema. Very, elongated uvula. Hypopharynx was also without lesion or edema. Larynx was mobile without lesions. Mild post cricoid, intra arytenoid arytenoid edema but no erythema.  True vocal folds were white without mass or lesion. Base of tongue was within normal limits. The patient tolerated the procedure well.

## 2022-03-01 NOTE — ASSESSMENT
[FreeTextEntry1] : Reviewed and reconciled medications, allergies, PMHx, PSHx, SocHx, FMHx. \par \par h/o hoarseness, throat clearing and reflux- on Pantoprazole and reflux diet\par acute recurrent sinusitis\par Very, elongated uvula\par Mild post cricoid, intra arytenoid arytenoid edema but no erythema\par Left side with superiorly deviated septum, large middle turb\par Right side with deviated septum\par \par Mini CT sinus: small polyp in left sphenoid. deviated septum to right. minimal thickening in the ethmoids. thickening in floor of left maxillary sinus. turbs slightly larger on left than right. \par \par Plan:\par Flexible Nasal Endoscopy with a Look at the Larynx. Cerumen removed. Mini CT sinus - interpreted by  Saline gel spray. Steam in the shower. Wendy and reviewed with the patient, pending radiologist review. KEVIN smithn.

## 2022-03-01 NOTE — ADDENDUM
[FreeTextEntry1] : Documented by John Grewal acting as scribe for Dr. Nguyen on 03/01/2022.\par \par All Medical record entries made by the Scribe were at my, Dr. Nguyen, direction and personally dictated by me on 03/01/2022. I have reviewed the chart and agree that the record accurately reflects my personal performance of the history, physical exam, assessment and plan. I have also personally directed, reviewed, and agreed with the discharge instructions.

## 2022-03-14 ENCOUNTER — NON-APPOINTMENT (OUTPATIENT)
Age: 62
End: 2022-03-14

## 2022-05-03 ENCOUNTER — APPOINTMENT (OUTPATIENT)
Dept: OTOLARYNGOLOGY | Facility: CLINIC | Age: 62
End: 2022-05-03
Payer: COMMERCIAL

## 2022-05-03 VITALS
SYSTOLIC BLOOD PRESSURE: 132 MMHG | HEART RATE: 61 BPM | HEIGHT: 71 IN | DIASTOLIC BLOOD PRESSURE: 82 MMHG | BODY MASS INDEX: 22.4 KG/M2 | WEIGHT: 160 LBS

## 2022-05-03 DIAGNOSIS — R07.0 PAIN IN THROAT: ICD-10-CM

## 2022-05-03 PROCEDURE — 31575 DIAGNOSTIC LARYNGOSCOPY: CPT

## 2022-05-03 PROCEDURE — 99213 OFFICE O/P EST LOW 20 MIN: CPT | Mod: 25

## 2022-05-03 RX ORDER — HUMAN IMMUNOGLOBULIN G 0.2 G/ML
LIQUID SUBCUTANEOUS
Refills: 0 | Status: DISCONTINUED | COMMUNITY
End: 2022-05-03

## 2022-05-03 NOTE — HISTORY OF PRESENT ILLNESS
[de-identified] : The patient presents with h/o hoarseness, throat clearing and reflux- on Pantoprazole and Pepcid, and reflux diet. Pt presents today c/o throat pain, worsened yesterday. Pt states that his LPR returned about a month ago due to cheating on the diet. Pt is not using Albuterol frequently, but has been using Advair. Denies any other symptoms such as fever.

## 2022-05-03 NOTE — ASSESSMENT
[FreeTextEntry1] : Reviewed and reconciled medications, allergies, PMHx, PSHx, SocHx, FMHx. \par \par h/o hoarseness, throat clearing and reflux- on Pantoprazole and Pepcid, and reflux diet\par throat pain\par \par FFL findings: Base of tongue was with some minimal lymphoid tissue. Mild edema of arytenoid, interarytenoid, and post cricoid. No pooling, masses or growths. \par \par Plan:\par Cerumen removed. Flexible Fiberoptic Laryngoscopy. Rinse well after use of Advair. If symptoms worsen then Azithromycin- 1 tablet daily.

## 2022-05-03 NOTE — ADDENDUM
[FreeTextEntry1] : Documented by John Grewal acting as scribe for Dr. Nguyen on 05/03/2022.\par \par All Medical record entries made by the Scribe were at my, Dr. Nguyen, direction and personally dictated by me on 05/03/2022. I have reviewed the chart and agree that the record accurately reflects my personal performance of the history, physical exam, assessment and plan. I have also personally directed, reviewed, and agreed with the discharge instructions.

## 2022-05-03 NOTE — PHYSICAL EXAM
[] : septum deviated to the right [Midline] : trachea located in midline position [Normal] : the left middle ear was normal [FreeTextEntry8] : Cerumen removed via curettage.  [FreeTextEntry9] : Cerumen removed via curettage.  [de-identified] : deviated septum along the floor on the right [de-identified] : mild uvular edema

## 2022-05-03 NOTE — PROCEDURE
[Complicated Symptoms] : complicated symptoms requiring more thorough examination than provided by mirror [None] : none [Flexible Endoscope] : examined with the flexible endoscope [FreeTextEntry6] : Procedure: Flexible Fiberoptic Laryngoscopy: Risks, benefits, and alternatives of flexible endoscopy were explained to the patient. The patient gave oral consent to proceed. The flexible scope was inserted into the right nasal cavity and advanced towards the nasopharynx. Visualized mucosa over the turbinates and septum were as describe above.  The nasopharynx was clear. Oropharyngeal walls were symmetric and mobile without lesion, mass, or edema. Hypopharynx was also without lesion or edema. Larynx was mobile without lesions. Supraglottic structures were free of edema, mass, and asymmetry. True vocal folds were white without mass or lesion. Base of tongue was within normal limits. [de-identified] : Procedure: Flexible Fiberoptic Laryngoscopy: Risks, benefits, and alternatives of flexible endoscopy were explained to the patient. The patient gave oral consent to proceed. The flexible scope was inserted into the right nasal cavity and advanced towards the nasopharynx. Visualized mucosa over the turbinates and septum were as describe above.  The nasopharynx was clear. Oropharyngeal walls were symmetric and mobile without lesion, mass, or edema. Hypopharynx was also without lesion or edema. Larynx was mobile without lesions. True vocal folds were white without mass or lesion. Base of tongue was with some minimal lymphoid tissue. Mild edema of arytenoid, interarytenoid, and post cricoid. No pooling, masses or growths.

## 2022-06-02 ENCOUNTER — NON-APPOINTMENT (OUTPATIENT)
Age: 62
End: 2022-06-02

## 2022-06-02 ENCOUNTER — APPOINTMENT (OUTPATIENT)
Dept: PEDIATRIC ALLERGY IMMUNOLOGY | Facility: CLINIC | Age: 62
End: 2022-06-02
Payer: COMMERCIAL

## 2022-06-02 VITALS
WEIGHT: 162 LBS | DIASTOLIC BLOOD PRESSURE: 82 MMHG | HEIGHT: 71 IN | SYSTOLIC BLOOD PRESSURE: 130 MMHG | HEART RATE: 55 BPM | OXYGEN SATURATION: 96 % | BODY MASS INDEX: 22.68 KG/M2

## 2022-06-02 VITALS — TEMPERATURE: 97.2 F

## 2022-06-02 PROCEDURE — 99213 OFFICE O/P EST LOW 20 MIN: CPT | Mod: 25

## 2022-06-02 PROCEDURE — 94375 RESPIRATORY FLOW VOLUME LOOP: CPT

## 2022-06-02 RX ORDER — AZITHROMYCIN 500 MG/1
500 TABLET, FILM COATED ORAL DAILY
Qty: 7 | Refills: 0 | Status: DISCONTINUED | COMMUNITY
Start: 2022-05-03 | End: 2022-06-02

## 2022-06-02 NOTE — REASON FOR VISIT
[Routine Follow-Up] : a routine follow-up visit for [Allergy Evaluation/ Skin Testing] : allergy evaluation and or skin testing [Asthma] : asthma [Wheezing] : wheezing [Cough] : cough

## 2022-06-27 ENCOUNTER — RX CHANGE (OUTPATIENT)
Age: 62
End: 2022-06-27

## 2022-06-27 RX ORDER — FLUTICASONE PROPIONATE 50 UG/1
50 SPRAY, METERED NASAL DAILY
Qty: 16 | Refills: 3 | Status: DISCONTINUED | COMMUNITY
Start: 2022-06-02 | End: 2022-06-27

## 2022-07-25 ENCOUNTER — RX CHANGE (OUTPATIENT)
Age: 62
End: 2022-07-25

## 2022-07-25 RX ORDER — FLUTICASONE PROPIONATE 50 UG/1
50 SPRAY, METERED NASAL
Qty: 16 | Refills: 3 | Status: DISCONTINUED | COMMUNITY
Start: 2022-06-27 | End: 2022-07-25

## 2022-07-27 NOTE — IMPRESSION
[Spirometry] : Spirometry [Normal Spirometry] : spirometry normal [FreeTextEntry1] : Normal spirogram - no significant change from previous study

## 2022-07-27 NOTE — ASSESSMENT
[FreeTextEntry1] : 62 yr old with CVID and moderate persistent asthma - recent acute flare decrease with short course of oral steroids but now back again\par Doubt bronchitis or sinusitis\par \par Start Prednisone again 40 mg qd for 4 days with taper\par Albuterol q 4h\par If no better by next week will consider antibiotics\par \par Total MD time spent on this encounter was 28 minutes.  This includes time devoted to preparing to see the patient with review of previous medical record, obtaining medical history, performing physical exam, counseling and patient education with patient and family, ordering medications and lab studies, documentation in the medical record and coordination of care.\par

## 2022-07-27 NOTE — HISTORY OF PRESENT ILLNESS
[de-identified] : 62 yr old with CVID now here for follow up. Pt had several year history of very low levels of IgG in the 300 range and generally has not been sick over those years but recently had noticed a slight increase number ofd sinus infections to 2-3x/year usually treated with antibiotics.  He has underlying moderate persistent asthma and is using Advair 50/500 1p bid along with Singulair 10 mg and albuterol MDI prn. He was recently diagnosed with GERD and LPR and is currently on Pepcid and Protonix.   \par \par After much deliberation he was stated on Hizentra 15 gms q 2 weeks SQ (400 mg/kg per month)- He is now doing the infusions himself-receives 75 ml dividend into 3 sites that takes 1 -hrs to infuse. He is tolerating very well. He has been on infusions since Jan now for 6 months\par He is now feeling better with more energy. IgG levels have climbed form 347 now to trough of 888 with IgA 46. Normal CBC except for minimal low PLT count which has been low for awhile.\par He received two COVID vaccines - his primary care MD did recently check his COVID spike Ab (Roche) and he had high protective levels >250\par \par He remains on his Singulair, Advair and Protonix \par \par Trough IgG are now at 979 - has had two sinus infections last years - both needed Levaquin and short courses of oral steroids 8/21 and 10/21 but other wise feels well\par \par He is now here for acute follow up. About 3-4 weeks ago URI complaints with rhinitis, nasal congestion and cough - went to PMD who suggested antibiotics but pt did not take. Started Mucinex and eventually short 5 day course of Prednisone and did very well with significant improvement in wheezing and cough  while using Advair, Singulair and occasional albuterol\par Past few days cough and chest tightness have returned with slight increase in wheezing. No fever no productive sputum - no nasal or sinus complaints

## 2022-07-27 NOTE — PHYSICAL EXAM
[Alert] : alert [Well Nourished] : well nourished [No Discharge] : no discharge [Normal TMs] : both tympanic membranes were normal [No Thrush] : no thrush [No Neck Mass] : no neck mass was observed [Wheezing] : wheezing was heard [Boggy Nasal Turbinates] : no boggy and/or pale nasal turbinates [Posterior Pharyngeal Cobblestoning] : no posterior pharyngeal cobblestoning [de-identified] : Few rhonchi, no rales

## 2022-07-27 NOTE — REVIEW OF SYSTEMS
[Nasal Congestion] : nasal congestion [Post Nasal Drip] : post nasal drip [Cough] : cough [Wheezing] : wheezing [Recurrent Sinus Infections] : recurrent sinus infections [Nl] : Integumentary [Rhinorrhea] : no rhinorrhea [Nasal Itching] : no nasal itching [Sneezing] : no sneezing [Recurrent Throat Infections] : no recurrence of throat infections [Recurrent Bronchitis] : no recurrent bronchitis [Recurrent Ear Infections] : no recurrence or ear infections [Recurrent Skin Infections] : no recurrent skin infections [Recurrent Pneumonia] : no ~T recurrent pneumonia

## 2022-08-18 ENCOUNTER — NON-APPOINTMENT (OUTPATIENT)
Age: 62
End: 2022-08-18

## 2022-08-21 LAB
ALBUMIN SERPL ELPH-MCNC: 4.2 G/DL
ALP BLD-CCNC: 60 U/L
ALT SERPL-CCNC: 21 U/L
ANION GAP SERPL CALC-SCNC: 9 MMOL/L
AST SERPL-CCNC: 25 U/L
BASOPHILS # BLD AUTO: 0.03 K/UL
BASOPHILS NFR BLD AUTO: 0.6 %
BILIRUB SERPL-MCNC: 0.7 MG/DL
BUN SERPL-MCNC: 11 MG/DL
CALCIUM SERPL-MCNC: 9.5 MG/DL
CHLORIDE SERPL-SCNC: 100 MMOL/L
CO2 SERPL-SCNC: 31 MMOL/L
CREAT SERPL-MCNC: 1.02 MG/DL
DEPRECATED KAPPA LC FREE/LAMBDA SER: 2.33 RATIO
EGFR: 84 ML/MIN/1.73M2
EOSINOPHIL # BLD AUTO: 0.06 K/UL
EOSINOPHIL NFR BLD AUTO: 1.2 %
GLUCOSE SERPL-MCNC: 113 MG/DL
HCT VFR BLD CALC: 42.1 %
HGB BLD-MCNC: 13.7 G/DL
IGA SER QL IEP: 34 MG/DL
IGG SER QL IEP: 908 MG/DL
IGM SER QL IEP: 37 MG/DL
IMM GRANULOCYTES NFR BLD AUTO: 0.2 %
KAPPA LC CSF-MCNC: 0.33 MG/DL
KAPPA LC SERPL-MCNC: 0.77 MG/DL
LYMPHOCYTES # BLD AUTO: 1.05 K/UL
LYMPHOCYTES NFR BLD AUTO: 21 %
MAN DIFF?: NORMAL
MCHC RBC-ENTMCNC: 31.6 PG
MCHC RBC-ENTMCNC: 32.5 GM/DL
MCV RBC AUTO: 97.2 FL
MONOCYTES # BLD AUTO: 0.47 K/UL
MONOCYTES NFR BLD AUTO: 9.4 %
NEUTROPHILS # BLD AUTO: 3.39 K/UL
NEUTROPHILS NFR BLD AUTO: 67.6 %
PLATELET # BLD AUTO: 117 K/UL
POTASSIUM SERPL-SCNC: 4.2 MMOL/L
PROT SERPL-MCNC: 6.4 G/DL
RBC # BLD: 4.33 M/UL
RBC # FLD: 12.4 %
SODIUM SERPL-SCNC: 139 MMOL/L
WBC # FLD AUTO: 5.01 K/UL

## 2022-08-23 ENCOUNTER — APPOINTMENT (OUTPATIENT)
Dept: PEDIATRIC ALLERGY IMMUNOLOGY | Facility: CLINIC | Age: 62
End: 2022-08-23

## 2022-08-23 VITALS
DIASTOLIC BLOOD PRESSURE: 84 MMHG | WEIGHT: 162 LBS | BODY MASS INDEX: 22.68 KG/M2 | TEMPERATURE: 96.7 F | HEIGHT: 71 IN | SYSTOLIC BLOOD PRESSURE: 133 MMHG | HEART RATE: 55 BPM | OXYGEN SATURATION: 98 %

## 2022-08-23 PROCEDURE — 99213 OFFICE O/P EST LOW 20 MIN: CPT

## 2022-08-23 RX ORDER — FAMOTIDINE 20 MG/1
20 TABLET, FILM COATED ORAL
Refills: 0 | Status: DISCONTINUED | COMMUNITY
End: 2022-08-23

## 2022-08-23 RX ORDER — PREDNISONE 20 MG/1
20 TABLET ORAL DAILY
Qty: 30 | Refills: 0 | Status: DISCONTINUED | COMMUNITY
Start: 2022-06-02 | End: 2022-08-23

## 2022-08-23 NOTE — REASON FOR VISIT
[Routine Follow-Up] : a routine follow-up visit for [Allergy Evaluation/ Skin Testing] : allergy evaluation and or skin testing [Congestion] : congestion

## 2022-08-23 NOTE — HISTORY OF PRESENT ILLNESS
[de-identified] : 62 yr old with CVID now here for follow up. Pt had several year history of very low levels of IgG in the 300 range and generally has not been sick over those years but recently had noticed a slight increase number ofd sinus infections to 2-3x/year usually treated with antibiotics.  He has underlying moderate persistent asthma and is using Advair 50/500 1p bid along with Singulair 10 mg and albuterol MDI prn. He was recently diagnosed with GERD and LPR and is currently on Pepcid and Protonix.   \par \par After much deliberation he was stated on Hizentra 15 gms q 2 weeks SQ (400 mg/kg per month)- He is now doing the infusions himself-receives 75 ml dividend into 3 sites that takes 1 -hrs to infuse.  \par \par He remains on his Singulair, Advair and Protonix \par \par Trough IgG are now between 800-900 - has had two sinus infections last years - both needed Levaquin and short courses of oral steroids 8/21 and 10/21 - no antibiotic since 10/21 - he has needed two short courses of oral steroids over past year for sinus pressure and headache - likely viral or allergic - He contnues to use Mucinex D PRN - recently over past week he has noted some increase sinus pressure headache and PND. \par \par

## 2022-08-23 NOTE — ASSESSMENT
[FreeTextEntry1] : 62 yr old with CVID - doing very well on Hizentra 15 gms q 2weeks without significant bacterial sinus infections or antibiotics use\par Some increase in sinus pressure headache currently but doing better with use of Mucinex D\par \par Pt noted ot have persistent borderline low platelet levels - will re check again in fall and if falling suggest hematology evaluation. \par \par Consider oral steroids in next 2 weeks if sinus complaints continue - ?? need to increase Hizentra dose??\par \par Follow up 4-6 months\par \par Total MD time spent on this encounter was 25 minutes.  This includes time devoted to preparing to see the patient with review of previous medical record, obtaining medical history, performing physical exam, counseling and patient education with patient and family, ordering medications and lab studies, documentation in the medical record and coordination of care.\par \par \par

## 2022-08-24 ENCOUNTER — RX CHANGE (OUTPATIENT)
Age: 62
End: 2022-08-24

## 2022-08-24 RX ORDER — FLUTICASONE PROPIONATE 50 UG/1
50 SPRAY, METERED NASAL
Qty: 16 | Refills: 3 | Status: DISCONTINUED | COMMUNITY
Start: 2022-07-25 | End: 2022-08-24

## 2022-09-22 ENCOUNTER — RX CHANGE (OUTPATIENT)
Age: 62
End: 2022-09-22

## 2022-09-22 RX ORDER — FLUTICASONE PROPIONATE AND SALMETEROL 500; 50 UG/1; UG/1
500-50 POWDER RESPIRATORY (INHALATION)
Qty: 180 | Refills: 0 | Status: DISCONTINUED | COMMUNITY
Start: 2020-10-27 | End: 2022-09-22

## 2022-09-22 RX ORDER — FLUTICASONE PROPIONATE 50 UG/1
50 SPRAY, METERED NASAL
Qty: 16 | Refills: 3 | Status: DISCONTINUED | COMMUNITY
Start: 2022-08-24 | End: 2022-09-22

## 2022-10-27 ENCOUNTER — APPOINTMENT (OUTPATIENT)
Dept: PEDIATRIC ALLERGY IMMUNOLOGY | Facility: CLINIC | Age: 62
End: 2022-10-27

## 2022-10-27 VITALS — RESPIRATION RATE: 16 BRPM | OXYGEN SATURATION: 97 % | HEART RATE: 57 BPM | TEMPERATURE: 98 F

## 2022-10-27 PROCEDURE — 90471 IMMUNIZATION ADMIN: CPT

## 2022-10-27 PROCEDURE — 90682 RIV4 VACC RECOMBINANT DNA IM: CPT

## 2022-11-02 ENCOUNTER — APPOINTMENT (OUTPATIENT)
Dept: OTOLARYNGOLOGY | Facility: CLINIC | Age: 62
End: 2022-11-02

## 2022-11-02 ENCOUNTER — NON-APPOINTMENT (OUTPATIENT)
Age: 62
End: 2022-11-02

## 2022-11-02 VITALS
DIASTOLIC BLOOD PRESSURE: 79 MMHG | SYSTOLIC BLOOD PRESSURE: 132 MMHG | BODY MASS INDEX: 22.4 KG/M2 | HEART RATE: 56 BPM | WEIGHT: 160 LBS | HEIGHT: 71 IN

## 2022-11-02 DIAGNOSIS — J45.909 UNSPECIFIED ASTHMA, UNCOMPLICATED: ICD-10-CM

## 2022-11-02 PROCEDURE — 31575 DIAGNOSTIC LARYNGOSCOPY: CPT

## 2022-11-02 PROCEDURE — 99213 OFFICE O/P EST LOW 20 MIN: CPT | Mod: 25

## 2022-11-02 NOTE — PROCEDURE
[Complicated Symptoms] : complicated symptoms requiring more thorough examination than provided by mirror [de-identified] : Flexible Laryngoscopy:\par -nasopharynx some vascular changes\par -no lesion no growths\par -BOT looks normal\par -minimal edema post cricoid\par -no erythema\par -no pooling\par -no lesions or growths

## 2022-11-02 NOTE — ASSESSMENT
[FreeTextEntry1] : Reviewed and reconciled medications, allergies, PMHx, PSHx, SocHx, FMHx \par \par Pt. with h/o hoarseness, throat clearing and reflux- on Pantoprazole and Pepcid, and reflux diet presents today for a 6 month follow up. Patient states he hasn’t had any reflux, no symptoms, and has weaned of the medications. He states he is controlling it with diet and exercise. He states if he cheats its really minimal and he will take a Pepcid. \par \par Physical Exam:\par -Right Ear: cerumen removed via curettage \par -Left Ear: cerumen removed via curettage \par -minimal deviation of the septum b/l\par -mildly inflamed turbinates \par -foamy mucus in oral cavity. \par -Uvula mildly edematous \par \par Flexible Laryngoscopy:\par -nasopharynx some vascular changes\par -no lesion no growths\par -BOT looks normal\par -minimal edema post cricoid\par -no erythema\par -no pooling\par -no lesions or growths \par \par Plan: cerumen removed b/l. Flexible Laryngoscopy. FU 6 months

## 2022-11-02 NOTE — CONSULT LETTER
[Dear  ___] : Dear  [unfilled], [Courtesy Letter:] : I had the pleasure of seeing your patient, [unfilled], in my office today. [Please see my note below.] : Please see my note below. [Consult Closing:] : Thank you very much for allowing me to participate in the care of this patient.  If you have any questions, please do not hesitate to contact me. [Sincerely,] : Sincerely, [FreeTextEntry1] : Silvino Nguyen MD FACS

## 2022-11-02 NOTE — PHYSICAL EXAM
[Hearing Gordillo Test (Tuning Fork On Forehead)] : no lateralization of tone [Midline] : trachea located in midline position [Normal] : no rashes [FreeTextEntry8] : cerumen removed via curettage  [FreeTextEntry9] : cerumen removed via curettage  [FreeTextEntry1] : -minimal deviation of the septum b/l\par -mildly inflamed turbinates  [de-identified] : foamy mucus in oral cavity. Uvula mildly edematous

## 2022-11-02 NOTE — HISTORY OF PRESENT ILLNESS
[de-identified] : Pt. with h/o hoarseness, throat clearing and reflux- on Pantoprazole and Pepcid, and reflux diet presents today for a 6 month follow up. Patient states he hasn’t had any reflux, no symptoms, and has weaned of the medications. He states he is controlling it with diet and exercise. He states if he cheats its really minimal and he will take a Pepcid.

## 2022-11-29 ENCOUNTER — RX CHANGE (OUTPATIENT)
Age: 62
End: 2022-11-29

## 2022-11-29 RX ORDER — BALOXAVIR MARBOXIL 40 MG/1
1 X 40 TABLET, FILM COATED ORAL DAILY
Qty: 1 | Refills: 0 | Status: ACTIVE | COMMUNITY
Start: 2022-11-29 | End: 1900-01-01

## 2022-12-05 ENCOUNTER — APPOINTMENT (OUTPATIENT)
Dept: PEDIATRIC ALLERGY IMMUNOLOGY | Facility: CLINIC | Age: 62
End: 2022-12-05

## 2022-12-05 VITALS
TEMPERATURE: 96.7 F | HEART RATE: 63 BPM | OXYGEN SATURATION: 100 % | SYSTOLIC BLOOD PRESSURE: 132 MMHG | DIASTOLIC BLOOD PRESSURE: 78 MMHG

## 2022-12-05 DIAGNOSIS — J06.9 ACUTE UPPER RESPIRATORY INFECTION, UNSPECIFIED: ICD-10-CM

## 2022-12-05 PROCEDURE — 99213 OFFICE O/P EST LOW 20 MIN: CPT

## 2022-12-05 NOTE — ASSESSMENT
[FreeTextEntry1] : 62 yr old with CVID - doing well on Hizentra now with URI with mild nasal sinus complaints - doing well with Mucinex and Tylenol at Day #4\par \par Would suggest for now holding off on using Medrol or antibiotics - if over next 48 hrs complaints increase can consider starting\par \par Total MD time spent on this encounter was 25 minutes.  This includes time devoted to preparing to see the patient with review of previous medical record, obtaining medical history, performing physical exam, counseling and patient education with patient and family, ordering medications and lab studies, documentation in the medical record and coordination of care.\par

## 2022-12-05 NOTE — HISTORY OF PRESENT ILLNESS
[de-identified] : 62 yr old with CVID now here for follow up. Pt had several year history of very low levels of IgG in the 300 range and generally has not been sick over those years but recently had noticed a slight increase number ofd sinus infections to 2-3x/year usually treated with antibiotics.  He has underlying moderate persistent asthma and is using Advair 50/500 1p bid along with Singulair 10 mg and albuterol MDI prn. He was recently diagnosed with GERD and LPR and is currently on Pepcid and Protonix.   \par \par After much deliberation he was stated on Hizentra 15 gms q 2 weeks SQ (400 mg/kg per month)- He is now doing the infusions himself-receives 75 ml dividend into 3 sites that takes 1 -hrs to infuse.  \par \par He remains on his Singulair, Advair and Protonix \par \par Trough IgG are now between 800-900 - has had two sinus infections last years - both needed Levaquin and short courses of oral steroids 8/21 and 10/21 - no antibiotic since 10/21 -\par \par He now returns for mild acute URI - child at home tested positive for Influenza - held on taking Zofluza secondary to insurance reasons. Now with mild URI complaints with low grade fever, mild cough and sinus pressure. Took both Mucinex 1200 and Mucinex D 120/1200 with significant relief now at day #4 - no chest congestion , no wheezing, mild sinus pressure\par Went to ER - negative flu and COVID tests was given Medrol dose pack and z-max to hold but not taken yet\par \par

## 2022-12-05 NOTE — REASON FOR VISIT
[Routine Follow-Up] : a routine follow-up visit for [Congestion] : congestion [FreeTextEntry3] : sinus ache

## 2022-12-05 NOTE — REVIEW OF SYSTEMS
[Nasal Congestion] : nasal congestion [Post Nasal Drip] : post nasal drip [Headache] : headache [Nl] : Gastrointestinal [Rhinorrhea] : no rhinorrhea [Sneezing] : no sneezing

## 2023-02-04 ENCOUNTER — NON-APPOINTMENT (OUTPATIENT)
Age: 63
End: 2023-02-04

## 2023-02-04 DIAGNOSIS — U07.1 COVID-19: ICD-10-CM

## 2023-02-08 ENCOUNTER — NON-APPOINTMENT (OUTPATIENT)
Age: 63
End: 2023-02-08

## 2023-02-23 ENCOUNTER — RX RENEWAL (OUTPATIENT)
Age: 63
End: 2023-02-23

## 2023-03-02 ENCOUNTER — RX RENEWAL (OUTPATIENT)
Age: 63
End: 2023-03-02

## 2023-05-02 ENCOUNTER — APPOINTMENT (OUTPATIENT)
Dept: OTOLARYNGOLOGY | Facility: CLINIC | Age: 63
End: 2023-05-02
Payer: COMMERCIAL

## 2023-05-02 VITALS
HEART RATE: 53 BPM | DIASTOLIC BLOOD PRESSURE: 79 MMHG | SYSTOLIC BLOOD PRESSURE: 118 MMHG | WEIGHT: 155 LBS | HEIGHT: 71 IN | BODY MASS INDEX: 21.7 KG/M2

## 2023-05-02 PROCEDURE — 99213 OFFICE O/P EST LOW 20 MIN: CPT | Mod: 25

## 2023-05-02 PROCEDURE — 31575 DIAGNOSTIC LARYNGOSCOPY: CPT

## 2023-05-02 NOTE — PROCEDURE
[Complicated Symptoms] : complicated symptoms requiring more thorough examination than provided by mirror [de-identified] : Flexible Laryngoscopy:\par -clear back to the nasopharynx\par -airway is wide open\par -BOT normal\par -minimal edema post cricoid\par -no pooling\par -cords move normally

## 2023-05-02 NOTE — ASSESSMENT
[FreeTextEntry1] : Reviewed and reconciled medications, allergies, PMHx, PSHx, SocHx, FMHx \par \par Pt. with h/o hoarseness, throat clearing, and reflux-following the reflux diet presents today for a 6 month follow up. Patient states he hasn’t had any acid reflux in a couple months. He states he only taking the Pantoprazole currently. He states he will take a Pepcid when he knows he is going to cheat on the diet. Patient states he has been feeling and doing well. Patient adds feeling cold symptoms about 3 times a year. States it is never an infection. He states he will take OTC Mucinex for that.\par \par Physical Exam:\par -Right ear: cerumen removed via curettage \par -Left ear: cerumen removed via curettage \par -deviated septum\par -inflamed turbinates\par -mild narrowing but no obstruction \par -elongated floppy uvula\par -type 3 oral cavity\par \par Flexible Laryngoscopy:\par -clear back to the nasopharynx\par -airway is wide open\par -BOT normal\par -minimal edema post cricoid\par -no pooling\par -cords move normally\par \par Plan: cerumen removed bilaterally. Flexible Laryngoscopy. Continue taking the Pantoprazole and following the diet. Sinus rinse when feeling cold come on. FU 6 months.

## 2023-05-02 NOTE — HISTORY OF PRESENT ILLNESS
[de-identified] : Pt. with h/o hoarseness, throat clearing, and reflux-following the reflux diet presents today for a 6 month follow up. Patient states he hasn’t had any acid reflux in a couple months. He states he only taking the Pantoprazole currently. He states he will take a Pepcid when he knows he is going to cheat on the diet. Patient states he has been feeling and doing well. Patient adds feeling cold symptoms about 3 times a year. States it is never an infection. He states he will take OTC Mucinex for that.

## 2023-05-02 NOTE — PHYSICAL EXAM
[Hearing Gordillo Test (Tuning Fork On Forehead)] : no lateralization of tone [Midline] : trachea located in midline position [Normal] : no rashes [FreeTextEntry8] : cerumen removed via curettage  [FreeTextEntry9] : cerumen removed via curettage  [FreeTextEntry1] : -deviated septum\par -inflamed turbinates\par -mild narrowing but no obstruction  [de-identified] : elongated floppy uvula  [de-identified] : type 3 oral cavity [FreeTextEntry2] : sensations intact. sinuses nontender to percussion

## 2023-06-09 ENCOUNTER — APPOINTMENT (OUTPATIENT)
Dept: OTOLARYNGOLOGY | Facility: CLINIC | Age: 63
End: 2023-06-09
Payer: COMMERCIAL

## 2023-06-09 VITALS
SYSTOLIC BLOOD PRESSURE: 120 MMHG | WEIGHT: 160 LBS | BODY MASS INDEX: 22.4 KG/M2 | DIASTOLIC BLOOD PRESSURE: 80 MMHG | HEART RATE: 58 BPM | HEIGHT: 71 IN

## 2023-06-09 DIAGNOSIS — H93.A2 PULSATILE TINNITUS, LEFT EAR: ICD-10-CM

## 2023-06-09 DIAGNOSIS — G50.1 ATYPICAL FACIAL PAIN: ICD-10-CM

## 2023-06-09 PROCEDURE — 99214 OFFICE O/P EST MOD 30 MIN: CPT | Mod: 25

## 2023-06-09 PROCEDURE — 92557 COMPREHENSIVE HEARING TEST: CPT

## 2023-06-09 PROCEDURE — 31231 NASAL ENDOSCOPY DX: CPT

## 2023-06-09 PROCEDURE — 92570 ACOUSTIC IMMITANCE TESTING: CPT

## 2023-06-09 NOTE — PROCEDURE
[None] : none [FreeTextEntry6] : Nasal Endoscopy\par Left\par large middle turb\par middle meatus normal\par no discharge\par accesory ostium left maxillary sinus\par clear to NP\par \par Right\par deviated septum with sput \par middle meatus normal\par clear to NP\par accesory ostium right maxillary sinus [de-identified] : pulsating

## 2023-06-09 NOTE — ADDENDUM
[FreeTextEntry1] : Documented by Ivette Silva acting as a scribe for Dr. Nguyen on [mm//dd/yyyy]. \par \par All Medical record entries made by the scribe were at my, Dr. Nguyen, direction and personally directed by me on 06/09/2023. I have reviewed the chart and agree that the record accurately reflects my personal performance of the history, physical exam, assessment, and plan. I have also personally directed, reviewed, and agreed with the discharge instructions.

## 2023-06-09 NOTE — HISTORY OF PRESENT ILLNESS
[de-identified] : Pt. with h/o hoarseness, throat clearing, and reflux-following the reflux diet Presents today for pulsating on left side sinuses. Lately, more light pulsating in ear. No changes in ear. No discharge or blood from the nose. It gets worse when reading. Slows down when taking deep breaths. No positional changes. No visual changes. Does not see pulsation in mirror. Improving pulsation past dew days. Pt works out a lot, stopped recently due to presence of hernia. More consistent with vascular than neurological issue. Few years ago the pulsation was in the right frontal area. \par \par

## 2023-06-09 NOTE — PHYSICAL EXAM
[Hearing Gordillo Test (Tuning Fork On Forehead)] : no lateralization of tone [Normal] : orientation to person, place, and time: normal [FreeTextEntry1] : sensations intact sinuses nontender to percussion inferior orbital nerve normal, TMJ - Mastoid - [Hearing Loss Right Only] : normal [Hearing Loss Left Only] : normal [FreeTextEntry9] : dry flaky wax left ear canal cerumen removed via curettage  [de-identified] : deviated septum  [de-identified] : inflamed turbinates  [de-identified] : elongated floppy uvula

## 2023-06-09 NOTE — ASSESSMENT
[FreeTextEntry1] : Reviewed and Reconciled the pmhx, fmhx, sochx, and medhx\par Pt. with h/o hoarseness, throat clearing, and reflux-following the reflux diet Presents today for pulsating on left side sinuses. Lately, more light pulsating in ear. No changes in ear. No discharge or blood from the nose. It gets worse when reading. Slows down when taking deep breaths. No positional changes. No visual changes. Does not see pulsation in mirror. Improving pulsation past dew days. Pt works out a lot, stopped recently due to presence of hernia. More consistent with vascular than neurological issue. Few years ago the pulsation was in the right frontal area. \par \par Physical Exam:\par sensations intact sinuses nontender to percussion inferior orbital nerve normal, TMJ - Mastoid -\par dry flaky wax left ear canal cerumen removed via curettage \par deviated septum\par inflamed turbinates\par elongated floppy uvula\par \par 3/22 CT sinuses\par mild polypoid thickening max sinuses b/l drain pathways open\par deviated septum with spurring\par \par Nasal Endoscopy\par Left\par large middle turb\par middle meatus normal\par no discharge\par accessory ostium left maxillary sinus\par clear to NP\par \par Right\par deviated septum with sput \par middle meatus normal\par clear to NP\par accessory ostium right maxillary sinus\par \par Audio 6/9/23\par Type A tymps, AU.\par Hearing WNL, AU.\par 100%discrim at 50db\par TPP -4 right\par TPP 0 left\par No pulsation found\par \par Plan: Audio- results interpreted by Dr. Nguyen and reviewed with the patient. MRI/MRA/MRV ordered at [facility]. FU after scan

## 2023-07-20 ENCOUNTER — RX RENEWAL (OUTPATIENT)
Age: 63
End: 2023-07-20

## 2023-08-30 ENCOUNTER — APPOINTMENT (OUTPATIENT)
Dept: OTOLARYNGOLOGY | Facility: CLINIC | Age: 63
End: 2023-08-30

## 2023-08-31 ENCOUNTER — APPOINTMENT (OUTPATIENT)
Dept: PEDIATRIC ALLERGY IMMUNOLOGY | Facility: CLINIC | Age: 63
End: 2023-08-31
Payer: COMMERCIAL

## 2023-08-31 VITALS
OXYGEN SATURATION: 96 % | HEART RATE: 74 BPM | BODY MASS INDEX: 23.15 KG/M2 | SYSTOLIC BLOOD PRESSURE: 130 MMHG | WEIGHT: 166 LBS | DIASTOLIC BLOOD PRESSURE: 76 MMHG

## 2023-08-31 PROCEDURE — 99213 OFFICE O/P EST LOW 20 MIN: CPT

## 2023-08-31 RX ORDER — NIRMATRELVIR AND RITONAVIR 300-100 MG
20 X 150 MG & KIT ORAL
Qty: 1 | Refills: 0 | Status: DISCONTINUED | COMMUNITY
Start: 2023-02-04 | End: 2023-08-31

## 2023-08-31 RX ORDER — PANTOPRAZOLE SODIUM 40 MG/1
40 TABLET, DELAYED RELEASE ORAL
Refills: 0 | Status: DISCONTINUED | COMMUNITY
End: 2023-08-31

## 2023-08-31 NOTE — ASSESSMENT
[FreeTextEntry1] : 63 yr old with CVID, sinus headache and episodic sinusitis Now with URI with post infectious sinus HA - previous sinus CT shows DNS and a nasal spur  Finish course of oral steroids No need for antibitoics for now  Follow up 3-4 weeks

## 2023-08-31 NOTE — REVIEW OF SYSTEMS
[Rhinorrhea] : no rhinorrhea [Nasal Congestion] : nasal congestion [Post Nasal Drip] : post nasal drip [Recurrent Sinus Infections] : no recurrent sinus infections [Nl] : Integumentary

## 2023-08-31 NOTE — HISTORY OF PRESENT ILLNESS
[de-identified] : 63 yr old with CVID now here for follow up. Pt had several year history of very low levels of IgG in the 300 range and generally has not been sick over those years but recently had noticed a slight increase number ofd sinus infections to 2-3x/year usually treated with antibiotics.  He has underlying moderate persistent asthma and is using Advair 50/500 1p bid along with Singulair 10 mg and albuterol MDI prn. He was recently diagnosed with GERD and LPR and is currently on Pepcid and Protonix.     After much deliberation he was stated on Hizentra 15 gms q 2 weeks SQ (400 mg/kg per month)- He is now doing the infusions himself-receives 75 ml dividend into 3 sites that takes 1 -hrs to infuse.    He remains on his Singulair, Advair and Protonix   Trough IgG are now between 800-900 - has had two sinus infections last years in 2022 - none this year in 2023 - both needed Levaquin and short courses of oral steroids 8/21 and 10/21 - no antibiotic since 10/21 -  He now returns 8/23 with complaints of sinus pressure headache for two weeks after URI - he originally mused Mucinex @ 120/1200 bid for few days with relief but compaltisn now back He just started on Prednisone with relief.

## 2023-09-05 LAB
ALBUMIN SERPL ELPH-MCNC: 4.6 G/DL
ALP BLD-CCNC: 66 U/L
ALT SERPL-CCNC: 22 U/L
ANION GAP SERPL CALC-SCNC: 9 MMOL/L
AST SERPL-CCNC: 21 U/L
BILIRUB SERPL-MCNC: 0.7 MG/DL
BUN SERPL-MCNC: 12 MG/DL
CALCIUM SERPL-MCNC: 9.4 MG/DL
CHLORIDE SERPL-SCNC: 99 MMOL/L
CO2 SERPL-SCNC: 30 MMOL/L
CREAT SERPL-MCNC: 0.88 MG/DL
DEPRECATED KAPPA LC FREE/LAMBDA SER: 2.58 RATIO
EGFR: 98 ML/MIN/1.73M2
GLUCOSE SERPL-MCNC: 111 MG/DL
IGA SER QL IEP: 55 MG/DL
IGG SER QL IEP: 1061 MG/DL
IGM SER QL IEP: 49 MG/DL
KAPPA LC CSF-MCNC: 0.24 MG/DL
KAPPA LC SERPL-MCNC: 0.62 MG/DL
POTASSIUM SERPL-SCNC: 4.2 MMOL/L
PROT SERPL-MCNC: 6.7 G/DL
SODIUM SERPL-SCNC: 138 MMOL/L

## 2023-09-08 ENCOUNTER — NON-APPOINTMENT (OUTPATIENT)
Age: 63
End: 2023-09-08

## 2023-09-11 ENCOUNTER — APPOINTMENT (OUTPATIENT)
Dept: PEDIATRIC ALLERGY IMMUNOLOGY | Facility: CLINIC | Age: 63
End: 2023-09-11
Payer: COMMERCIAL

## 2023-09-11 ENCOUNTER — NON-APPOINTMENT (OUTPATIENT)
Age: 63
End: 2023-09-11

## 2023-09-11 PROCEDURE — 99214 OFFICE O/P EST MOD 30 MIN: CPT | Mod: 25

## 2023-09-11 PROCEDURE — 94375 RESPIRATORY FLOW VOLUME LOOP: CPT

## 2023-10-16 ENCOUNTER — RX RENEWAL (OUTPATIENT)
Age: 63
End: 2023-10-16

## 2023-10-23 ENCOUNTER — APPOINTMENT (OUTPATIENT)
Dept: PEDIATRIC ALLERGY IMMUNOLOGY | Facility: CLINIC | Age: 63
End: 2023-10-23
Payer: COMMERCIAL

## 2023-10-23 VITALS — TEMPERATURE: 97.5 F

## 2023-10-23 PROCEDURE — 90682 RIV4 VACC RECOMBINANT DNA IM: CPT

## 2023-10-23 PROCEDURE — 90471 IMMUNIZATION ADMIN: CPT

## 2023-12-15 ENCOUNTER — APPOINTMENT (OUTPATIENT)
Dept: OTOLARYNGOLOGY | Facility: CLINIC | Age: 63
End: 2023-12-15
Payer: COMMERCIAL

## 2023-12-15 VITALS
DIASTOLIC BLOOD PRESSURE: 88 MMHG | HEART RATE: 51 BPM | BODY MASS INDEX: 23.1 KG/M2 | SYSTOLIC BLOOD PRESSURE: 144 MMHG | WEIGHT: 165 LBS | HEIGHT: 71 IN

## 2023-12-15 DIAGNOSIS — K13.79 OTHER LESIONS OF ORAL MUCOSA: ICD-10-CM

## 2023-12-15 PROCEDURE — 31575 DIAGNOSTIC LARYNGOSCOPY: CPT

## 2023-12-15 PROCEDURE — 99213 OFFICE O/P EST LOW 20 MIN: CPT | Mod: 25

## 2023-12-15 NOTE — HISTORY OF PRESENT ILLNESS
[de-identified] :  Pt. with h/o hoarseness, throat clearing, reflux -following the reflux diet, and pulsatile tinnitus left ear presents today follow up. Patient says he feels good. Patient has been on diet and denies feeling burning.

## 2023-12-15 NOTE — PROCEDURE
[Complicated Symptoms] : complicated symptoms requiring more thorough examination than provided by mirror [de-identified] : Flexible laryngoscopy: -clear nasopharynx -minimal  Edema and erythema of the postcricoid, arytenoids and interarytenoids. -no pooling, no lesions, no growths

## 2023-12-15 NOTE — ASSESSMENT
[FreeTextEntry1] : Reviewed and reconciled medications, allergies, PMHx, PSHx, SocHx, FMHx    MRI neck: 06/19/23: -normal -degenerative changes of cervical spine MRA/MRV head: 06/19/23: -normal  Physical Exam: -deviated septum bilaterally  -mildly inflamed turbinates -uvula very long and sits on tongue. -Petechia on soft palate  Flexible laryngoscopy: -clear nasopharynx -minimal  Edema and erythema of the postcricoid, arytenoids and interarytenoids. -no pooling, no lesions, no growths  Plan: FU 6 months.

## 2023-12-15 NOTE — PHYSICAL EXAM
[] : septum deviated bilaterally [Midline] : trachea located in midline position [Normal] : no rashes [FreeTextEntry1] :  sensations intact. sinuses nontender to percussion [Hearing Loss Right Only] : normal [Hearing Loss Left Only] : normal [FreeTextEntry8] :  cerumen removed via curettage [FreeTextEntry9] :  cerumen removed via curettage [de-identified] :  mildly inflammed turbinates [de-identified] : uvula very long and sits on tongue. Petechia on soft palate

## 2024-01-18 RX ORDER — FLUTICASONE PROPIONATE 50 UG/1
50 SPRAY, METERED NASAL
Qty: 1 | Refills: 3 | Status: ACTIVE | COMMUNITY
Start: 2022-09-22 | End: 1900-01-01

## 2024-01-25 RX ORDER — IMMUNE GLOBULIN INFUSION (HUMAN) 100 MG/ML
30 INJECTION, SOLUTION INTRAVENOUS; SUBCUTANEOUS
Qty: 1 | Refills: 5 | Status: ACTIVE | COMMUNITY
Start: 2024-01-25 | End: 1900-01-01

## 2024-02-15 LAB
ALBUMIN SERPL ELPH-MCNC: 4.5 G/DL
ALP BLD-CCNC: 74 U/L
ALT SERPL-CCNC: 48 U/L
ANION GAP SERPL CALC-SCNC: 10 MMOL/L
AST SERPL-CCNC: 34 U/L
BASOPHILS # BLD AUTO: 0.02 K/UL
BASOPHILS NFR BLD AUTO: 0.4 %
BILIRUB SERPL-MCNC: 0.6 MG/DL
BUN SERPL-MCNC: 18 MG/DL
CALCIUM SERPL-MCNC: 9.4 MG/DL
CHLORIDE SERPL-SCNC: 100 MMOL/L
CO2 SERPL-SCNC: 30 MMOL/L
CREAT SERPL-MCNC: 1.07 MG/DL
DEPRECATED KAPPA LC FREE/LAMBDA SER: 2.24 RATIO
EGFR: 79 ML/MIN/1.73M2
EOSINOPHIL # BLD AUTO: 0.03 K/UL
EOSINOPHIL NFR BLD AUTO: 0.6 %
GLUCOSE SERPL-MCNC: 88 MG/DL
HCT VFR BLD CALC: 41.9 %
HGB BLD-MCNC: 14.2 G/DL
IGA SER QL IEP: 40 MG/DL
IGG SER QL IEP: 1291 MG/DL
IGM SER QL IEP: 44 MG/DL
IMM GRANULOCYTES NFR BLD AUTO: 0.2 %
KAPPA LC CSF-MCNC: 0.33 MG/DL
KAPPA LC SERPL-MCNC: 0.74 MG/DL
LYMPHOCYTES # BLD AUTO: 1.47 K/UL
LYMPHOCYTES NFR BLD AUTO: 30.1 %
MAN DIFF?: NORMAL
MCHC RBC-ENTMCNC: 31.3 PG
MCHC RBC-ENTMCNC: 33.9 GM/DL
MCV RBC AUTO: 92.5 FL
MONOCYTES # BLD AUTO: 0.5 K/UL
MONOCYTES NFR BLD AUTO: 10.2 %
NEUTROPHILS # BLD AUTO: 2.85 K/UL
NEUTROPHILS NFR BLD AUTO: 58.5 %
PLATELET # BLD AUTO: 139 K/UL
POTASSIUM SERPL-SCNC: 4.4 MMOL/L
PROT SERPL-MCNC: 7.1 G/DL
RBC # BLD: 4.53 M/UL
RBC # FLD: 12.5 %
SODIUM SERPL-SCNC: 140 MMOL/L
WBC # FLD AUTO: 4.88 K/UL

## 2024-02-16 ENCOUNTER — NON-APPOINTMENT (OUTPATIENT)
Age: 64
End: 2024-02-16

## 2024-02-22 ENCOUNTER — APPOINTMENT (OUTPATIENT)
Dept: PEDIATRIC ALLERGY IMMUNOLOGY | Facility: CLINIC | Age: 64
End: 2024-02-22
Payer: COMMERCIAL

## 2024-02-22 VITALS
BODY MASS INDEX: 23.71 KG/M2 | SYSTOLIC BLOOD PRESSURE: 129 MMHG | DIASTOLIC BLOOD PRESSURE: 83 MMHG | OXYGEN SATURATION: 100 % | HEART RATE: 44 BPM | WEIGHT: 170 LBS

## 2024-02-22 PROCEDURE — 99214 OFFICE O/P EST MOD 30 MIN: CPT

## 2024-02-22 RX ORDER — HUMAN IMMUNOGLOBULIN G 0.2 G/ML
10 LIQUID SUBCUTANEOUS
Qty: 1 | Refills: 11 | Status: DISCONTINUED | COMMUNITY
Start: 2022-01-17 | End: 2024-02-22

## 2024-02-22 RX ORDER — BUDESONIDE AND FORMOTEROL FUMARATE DIHYDRATE 160; 4.5 UG/1; UG/1
160-4.5 AEROSOL RESPIRATORY (INHALATION) TWICE DAILY
Qty: 1 | Refills: 0 | Status: DISCONTINUED | COMMUNITY
Start: 2023-02-04 | End: 2024-02-22

## 2024-02-22 RX ORDER — HUMAN IMMUNOGLOBULIN G 0.2 G/ML
1 LIQUID SUBCUTANEOUS
Qty: 1 | Refills: 11 | Status: DISCONTINUED | COMMUNITY
Start: 2022-01-17 | End: 2024-02-22

## 2024-02-22 RX ORDER — TIOTROPIUM BROMIDE INHALATION SPRAY 1.56 UG/1
1.25 SPRAY, METERED RESPIRATORY (INHALATION) DAILY
Qty: 1 | Refills: 3 | Status: DISCONTINUED | COMMUNITY
Start: 2023-09-11 | End: 2024-02-22

## 2024-02-22 NOTE — PHYSICAL EXAM
[Alert] : alert [Conjunctival Erythema] : no conjunctival erythema [Pale mucosa] : no pale mucosa [Boggy Nasal Turbinates] : no boggy and/or pale nasal turbinates [Pharyngeal erythema] : no pharyngeal erythema [Clear Rhinorrhea] : no clear rhinorrhea was seen [Posterior Pharyngeal Cobblestoning] : no posterior pharyngeal cobblestoning [Wheezing] : no wheezing was heard [No Neck Mass] : no neck mass was observed [Normal Cervical Lymph Nodes] : cervical [Normal Rate] : heart rate was normal  [Skin Intact] : skin intact

## 2024-02-22 NOTE — HISTORY OF PRESENT ILLNESS
[de-identified] : 63 yr old with CVID now here for follow up. Pt had several year history of very low levels of IgG in the 300 range and generally has not been sick over those years but eventually had an increase in sinus infections to 2-3x/year usually treated with antibiotics.  He has underlying moderate persistent asthma and is using Advair 50/500 1p bid along with Singulair 10 mg and albuterol MDI prn. He was recently diagnosed with GERD and LPR and is currently on Pepcid and Protonix.     After much deliberation he was stated on Hizentra 15 gms q 2 weeks SQ (400 mg/kg per month)- He initially did well with significant reduction in infections and IgG levels in the 800s - however recently his insurance no longer covers SQ Ig and he needed to be changed to IVIG with Gammagard - now at 30 gms q 4 weeks with pre treatment with Benadryl and Tylenol - He has since received ONE infusion at home with [facility] and did well - Last IgG was 1291up from 1061 but this is likely peak.  He remains on his Singulair, Advair 500/50 1p bid and Protonix

## 2024-04-16 RX ORDER — ALBUTEROL SULFATE 90 UG/1
108 (90 BASE) INHALANT RESPIRATORY (INHALATION)
Qty: 1 | Refills: 1 | Status: ACTIVE | COMMUNITY
Start: 2020-11-05 | End: 1900-01-01

## 2024-05-13 RX ORDER — EPINEPHRINE 0.3 MG/.3ML
0.3 INJECTION INTRAMUSCULAR
Qty: 1 | Refills: 1 | Status: ACTIVE | COMMUNITY
Start: 1900-01-01 | End: 1900-01-01

## 2024-05-16 RX ORDER — PREDNISONE 20 MG/1
20 TABLET ORAL DAILY
Qty: 20 | Refills: 0 | Status: ACTIVE | COMMUNITY
Start: 2024-05-16 | End: 1900-01-01

## 2024-05-17 ENCOUNTER — APPOINTMENT (OUTPATIENT)
Dept: OTOLARYNGOLOGY | Facility: CLINIC | Age: 64
End: 2024-05-17
Payer: COMMERCIAL

## 2024-05-17 VITALS
HEART RATE: 54 BPM | BODY MASS INDEX: 24.5 KG/M2 | WEIGHT: 175 LBS | SYSTOLIC BLOOD PRESSURE: 144 MMHG | DIASTOLIC BLOOD PRESSURE: 80 MMHG | HEIGHT: 71 IN

## 2024-05-17 DIAGNOSIS — R13.12 DYSPHAGIA, OROPHARYNGEAL PHASE: ICD-10-CM

## 2024-05-17 DIAGNOSIS — R49.9 UNSPECIFIED VOICE AND RESONANCE DISORDER: ICD-10-CM

## 2024-05-17 DIAGNOSIS — J34.2 DEVIATED NASAL SEPTUM: ICD-10-CM

## 2024-05-17 DIAGNOSIS — H60.63 UNSPECIFIED CHRONIC OTITIS EXTERNA, BILATERAL: ICD-10-CM

## 2024-05-17 DIAGNOSIS — J38.4 EDEMA OF LARYNX: ICD-10-CM

## 2024-05-17 PROCEDURE — 99214 OFFICE O/P EST MOD 30 MIN: CPT | Mod: 25

## 2024-05-17 PROCEDURE — 31575 DIAGNOSTIC LARYNGOSCOPY: CPT

## 2024-05-17 PROCEDURE — 99213 OFFICE O/P EST LOW 20 MIN: CPT | Mod: 25

## 2024-05-17 NOTE — HISTORY OF PRESENT ILLNESS
[de-identified] :  Pt. with h/o hoarseness, throat clearing, reflux -following the reflux diet, and pulsatile tinnitus left ear presents today for a 6 month follow up. Patient states he has been seeing Dr. Weir for his seasonal asthma. He is currently on a combination of Albuterol and Prednisone. Patient states reflux has been good and he has been sticking to the reflux diet. He states the only time he notices reflux symptoms is if he tries to speak after eating. 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

## 2024-05-17 NOTE — ADDENDUM
[FreeTextEntry1] : Documented by Ariana Morillo acting as scribe for Dr. Nguyen on 05/17/2024 All Medical record entries made by the Scribe were at my, Dr. Nguyen, direction and personally dictated by me on 05/17/2024  . I have reviewed the chart and agree that the record accurately reflects my personal performance of the history, physical exam, assessment and plan. I have also personally directed, reviewed, and agreed with the discharge instructions.

## 2024-05-17 NOTE — PROCEDURE
[Hoarseness] : hoarseness not clearly evaluated by indirect laryngoscopy [Complicated Symptoms] : complicated symptoms requiring more thorough examination than provided by mirror [de-identified] : Flexible Laryngoscopy: -airway is open -elongated uvula -normal cords -no pooling  -minimal edema of the postcricoid, arytenoids and interarytenoids

## 2024-05-17 NOTE — PHYSICAL EXAM
[Hearing Gordillo Test (Tuning Fork On Forehead)] : no lateralization of tone [Midline] : trachea located in midline position [Normal] : no rashes [FreeTextEntry8] : minimal cerumen removed via curettage  [FreeTextEntry9] : minimal cerumen removed via curettage  [de-identified] : mildly inflamed turbinates, mildly deviated septum along the floor [de-identified] : elongated uvula that sits on the BOT [de-identified] : no real tonsil tissue [FreeTextEntry2] : sensations intact. sinuses nontender to percussion

## 2024-05-17 NOTE — ASSESSMENT
[FreeTextEntry1] :  Reviewed and reconciled medications, allergies, PMHx, PSHx, SocHx, FMHx    Pt. with h/o hoarseness, throat clearing, reflux -following the reflux diet, and pulsatile tinnitus left ear presents today for a 6 month follow up. Patient states he has been seeing Dr. Weir for his seasonal asthma. He is currently on a combination of Albuterol and Prednisone. Patient states reflux has been good and he has been sticking to the reflux diet. He states the only time he notices reflux symptoms is if he tries to speak after eating. 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  FEEST 11/24/2020: -no evidence of stasis, penetration, or aspiration -active reflux  Physical exam: -right ear: minimal cerumen removed via curettage  -left ear: minimal cerumen removed via curettage  -mildly inflamed turbinates -mildly deviated septum along the floor -elongated uvula that sits on the BOT -no real tonsil tissue  Flexible Laryngoscopy: -airway is open -elongated uvula -normal cords -no pooling  -minimal edema of the postcricoid, arytenoids and interarytenoids  Plan: Trying drinking more water while eating. Try a double swallow. Esophagogram and MBS ordered. FU after test.

## 2024-05-20 ENCOUNTER — NON-APPOINTMENT (OUTPATIENT)
Age: 64
End: 2024-05-20

## 2024-05-29 ENCOUNTER — APPOINTMENT (OUTPATIENT)
Dept: PEDIATRIC ALLERGY IMMUNOLOGY | Facility: CLINIC | Age: 64
End: 2024-05-29
Payer: COMMERCIAL

## 2024-05-29 ENCOUNTER — NON-APPOINTMENT (OUTPATIENT)
Age: 64
End: 2024-05-29

## 2024-05-29 VITALS
BODY MASS INDEX: 24.41 KG/M2 | WEIGHT: 175 LBS | OXYGEN SATURATION: 100 % | SYSTOLIC BLOOD PRESSURE: 127 MMHG | DIASTOLIC BLOOD PRESSURE: 80 MMHG | HEART RATE: 57 BPM

## 2024-05-29 DIAGNOSIS — K21.9 GASTRO-ESOPHAGEAL REFLUX DISEASE W/OUT ESOPHAGITIS: ICD-10-CM

## 2024-05-29 DIAGNOSIS — D83.9 COMMON VARIABLE IMMUNODEFICIENCY, UNSPECIFIED: ICD-10-CM

## 2024-05-29 DIAGNOSIS — J01.91 ACUTE RECURRENT SINUSITIS, UNSPECIFIED: ICD-10-CM

## 2024-05-29 DIAGNOSIS — R49.0 DYSPHONIA: ICD-10-CM

## 2024-05-29 DIAGNOSIS — B99.9 UNSPECIFIED INFECTIOUS DISEASE: ICD-10-CM

## 2024-05-29 DIAGNOSIS — J32.9 CHRONIC SINUSITIS, UNSPECIFIED: ICD-10-CM

## 2024-05-29 DIAGNOSIS — J31.0 CHRONIC RHINITIS: ICD-10-CM

## 2024-05-29 DIAGNOSIS — J45.40 MODERATE PERSISTENT ASTHMA, UNCOMPLICATED: ICD-10-CM

## 2024-05-29 PROCEDURE — 94375 RESPIRATORY FLOW VOLUME LOOP: CPT

## 2024-05-29 PROCEDURE — 99214 OFFICE O/P EST MOD 30 MIN: CPT | Mod: 25

## 2024-05-29 RX ORDER — HUMAN IMMUNOGLOBULIN G 0.2 G/ML
4 LIQUID SUBCUTANEOUS
Qty: 1 | Refills: 11 | Status: DISCONTINUED | COMMUNITY
Start: 2022-01-17 | End: 2024-05-29

## 2024-05-29 NOTE — REASON FOR VISIT
[Routine Follow-Up] : a routine follow-up visit for [FreeTextEntry3] : chest tightness, chest congestion.

## 2024-05-29 NOTE — ASSESSMENT
[FreeTextEntry1] : 64 yr old with CVID, moderate persistent asthma, CRS,GERD, chronic rhinitis - now s/p URI with increase cough and chest tightness s/p two courses of oral steroids with partial improvement  PFT today - normal  Suggest trial of Mucinex 1200 mg bid Pt request CXR Continue infusions No need for antibiotics or oral steroids at this point

## 2024-05-29 NOTE — PHYSICAL EXAM
[Alert] : alert [Conjunctival Erythema] : no conjunctival erythema [Pale mucosa] : no pale mucosa [Clear Rhinorrhea] : no clear rhinorrhea was seen [No Neck Mass] : no neck mass was observed [Wheezing] : no wheezing was heard [Normal Rate] : heart rate was normal  [Skin Intact] : skin intact

## 2024-05-29 NOTE — REVIEW OF SYSTEMS
[Nasal Congestion] : nasal congestion [Post Nasal Drip] : post nasal drip [Cough] : cough [Sputum Production] : coughing up sputum [Recurrent Sinus Infections] : recurrent sinus infections [Recurrent Throat Infections] : no recurrence of throat infections [Recurrent Bronchitis] : no recurrent bronchitis [Recurrent Ear Infections] : no recurrence or ear infections [Recurrent Skin Infections] : no recurrent skin infections [Recurrent Pneumonia] : no ~T recurrent pneumonia [Nl] : Integumentary

## 2024-05-29 NOTE — HISTORY OF PRESENT ILLNESS
[de-identified] : 64 yr old with CVID now here for follow up. Pt generally doing weel with recurrent infections with good trough IgG levels on IVIG - changed from Hizentra secondary to insurance issues - q 4 weeks - had URI in early May which led to persistent cough and ?? chest tightness with at least two course of oral steroids needed to help relieve lower respiratory complaints   He is still using using Advair 50/500 1p bid along with Singulair 10 mg and albuterol MDI prn.? sowmya for course of antibiotics   He was recently diagnosed with GERD and LPR and is currently on Pepcid and Protonix.   He has seen Dr. Nguyen and had essentially normal upper nasal endoscopy and FEES - is schedule for modified Ba swallow   He was on Hizentra 15 gms q 2 weeks SQ (400 mg/kg per month)- He initially did well with significant reduction in infections and IgG levels in the 800s - however recently his insurance no longer covers SQ Ig and he needed to be changed to IVIG with Gammagard - now at 30 gms q 4 weeks with pre treatment with Benadryl and Tylenol - He has since received ONE infusion at home with [facility] and did well - Last IgG (2/14) was 1291 up from 1061 but this is likely peak.  He remains on his Singulair, Advair 500/50 1p bid and Protonix

## 2024-07-02 LAB
ALBUMIN SERPL ELPH-MCNC: 4.9 G/DL
ALP BLD-CCNC: 72 U/L
ALT SERPL-CCNC: 36 U/L
ANION GAP SERPL CALC-SCNC: 13 MMOL/L
AST SERPL-CCNC: 36 U/L
BASOPHILS # BLD AUTO: 0.03 K/UL
BASOPHILS NFR BLD AUTO: 0.4 %
BILIRUB SERPL-MCNC: 1.2 MG/DL
BUN SERPL-MCNC: 16 MG/DL
CALCIUM SERPL-MCNC: 9.8 MG/DL
CHLORIDE SERPL-SCNC: 99 MMOL/L
CO2 SERPL-SCNC: 27 MMOL/L
CREAT SERPL-MCNC: 0.98 MG/DL
DEPRECATED KAPPA LC FREE/LAMBDA SER: 1.75 RATIO
EGFR: 87 ML/MIN/1.73M2
EOSINOPHIL # BLD AUTO: 0.05 K/UL
EOSINOPHIL NFR BLD AUTO: 0.7 %
GLUCOSE SERPL-MCNC: 88 MG/DL
HCT VFR BLD CALC: 48.3 %
HGB BLD-MCNC: 15.5 G/DL
IGA SER QL IEP: 47 MG/DL
IGG SER QL IEP: 816 MG/DL
IGM SER QL IEP: 45 MG/DL
IMM GRANULOCYTES NFR BLD AUTO: 0.3 %
KAPPA LC CSF-MCNC: 0.4 MG/DL
KAPPA LC SERPL-MCNC: 0.7 MG/DL
LYMPHOCYTES # BLD AUTO: 1.72 K/UL
LYMPHOCYTES NFR BLD AUTO: 24.1 %
MAN DIFF?: NORMAL
MCHC RBC-ENTMCNC: 31.4 PG
MCHC RBC-ENTMCNC: 32.1 GM/DL
MCV RBC AUTO: 98 FL
MONOCYTES # BLD AUTO: 0.65 K/UL
MONOCYTES NFR BLD AUTO: 9.1 %
NEUTROPHILS # BLD AUTO: 4.66 K/UL
NEUTROPHILS NFR BLD AUTO: 65.4 %
PLATELET # BLD AUTO: 163 K/UL
POTASSIUM SERPL-SCNC: 4.2 MMOL/L
PROT SERPL-MCNC: 7.3 G/DL
RBC # BLD: 4.93 M/UL
RBC # FLD: 12.9 %
SODIUM SERPL-SCNC: 139 MMOL/L
WBC # FLD AUTO: 7.13 K/UL

## 2024-07-03 ENCOUNTER — NON-APPOINTMENT (OUTPATIENT)
Age: 64
End: 2024-07-03

## 2024-07-05 ENCOUNTER — OUTPATIENT (OUTPATIENT)
Dept: OUTPATIENT SERVICES | Facility: HOSPITAL | Age: 64
LOS: 1 days | End: 2024-07-05
Payer: COMMERCIAL

## 2024-07-05 ENCOUNTER — NON-APPOINTMENT (OUTPATIENT)
Age: 64
End: 2024-07-05

## 2024-07-05 DIAGNOSIS — R13.12 DYSPHAGIA, OROPHARYNGEAL PHASE: ICD-10-CM

## 2024-07-05 DIAGNOSIS — R49.9 UNSPECIFIED VOICE AND RESONANCE DISORDER: ICD-10-CM

## 2024-07-05 DIAGNOSIS — K21.9 GASTRO-ESOPHAGEAL REFLUX DISEASE WITHOUT ESOPHAGITIS: ICD-10-CM

## 2024-07-05 PROCEDURE — 74240 X-RAY XM UPR GI TRC 1CNTRST: CPT | Mod: 26

## 2024-07-05 PROCEDURE — 74230 X-RAY XM SWLNG FUNCJ C+: CPT | Mod: 26

## 2024-07-05 PROCEDURE — 74240 X-RAY XM UPR GI TRC 1CNTRST: CPT

## 2024-07-05 PROCEDURE — 74230 X-RAY XM SWLNG FUNCJ C+: CPT

## 2024-07-11 ENCOUNTER — APPOINTMENT (OUTPATIENT)
Dept: PEDIATRIC ALLERGY IMMUNOLOGY | Facility: CLINIC | Age: 64
End: 2024-07-11
Payer: COMMERCIAL

## 2024-07-11 ENCOUNTER — NON-APPOINTMENT (OUTPATIENT)
Age: 64
End: 2024-07-11

## 2024-07-11 VITALS
SYSTOLIC BLOOD PRESSURE: 122 MMHG | HEART RATE: 51 BPM | OXYGEN SATURATION: 97 % | WEIGHT: 175 LBS | BODY MASS INDEX: 24.41 KG/M2 | DIASTOLIC BLOOD PRESSURE: 75 MMHG

## 2024-07-11 DIAGNOSIS — J32.9 CHRONIC SINUSITIS, UNSPECIFIED: ICD-10-CM

## 2024-07-11 DIAGNOSIS — D83.9 COMMON VARIABLE IMMUNODEFICIENCY, UNSPECIFIED: ICD-10-CM

## 2024-07-11 DIAGNOSIS — K21.9 GASTRO-ESOPHAGEAL REFLUX DISEASE W/OUT ESOPHAGITIS: ICD-10-CM

## 2024-07-11 DIAGNOSIS — B99.9 UNSPECIFIED INFECTIOUS DISEASE: ICD-10-CM

## 2024-07-11 DIAGNOSIS — J34.2 DEVIATED NASAL SEPTUM: ICD-10-CM

## 2024-07-11 DIAGNOSIS — J45.40 MODERATE PERSISTENT ASTHMA, UNCOMPLICATED: ICD-10-CM

## 2024-07-11 DIAGNOSIS — J31.0 CHRONIC RHINITIS: ICD-10-CM

## 2024-07-11 PROCEDURE — 94375 RESPIRATORY FLOW VOLUME LOOP: CPT

## 2024-07-11 PROCEDURE — 99213 OFFICE O/P EST LOW 20 MIN: CPT | Mod: 25

## 2024-07-11 RX ORDER — MONTELUKAST 10 MG/1
10 TABLET, FILM COATED ORAL DAILY
Qty: 30 | Refills: 5 | Status: ACTIVE | COMMUNITY
Start: 2024-07-11 | End: 1900-01-01

## 2024-07-11 RX ORDER — FLUTICASONE PROPIONATE 50 UG/1
50 SPRAY, METERED NASAL DAILY
Qty: 1 | Refills: 4 | Status: ACTIVE | COMMUNITY
Start: 2024-07-11 | End: 1900-01-01

## 2024-07-11 RX ORDER — FAMOTIDINE 40 MG/1
40 TABLET, FILM COATED ORAL
Refills: 0 | Status: ACTIVE | COMMUNITY

## 2024-08-26 DIAGNOSIS — J45.40 MODERATE PERSISTENT ASTHMA, UNCOMPLICATED: ICD-10-CM

## 2024-08-26 DIAGNOSIS — U07.1 COVID-19: ICD-10-CM

## 2024-08-26 RX ORDER — NIRMATRELVIR AND RITONAVIR 300-100 MG
20 X 150 MG & KIT ORAL DAILY
Qty: 10 | Refills: 0 | Status: ACTIVE | COMMUNITY
Start: 2024-08-26 | End: 1900-01-01

## 2024-08-27 RX ORDER — FLUTICASONE FUROATE AND VILANTEROL TRIFENATATE 100; 25 UG/1; UG/1
100-25 POWDER RESPIRATORY (INHALATION)
Qty: 1 | Refills: 1 | Status: ACTIVE | COMMUNITY
Start: 2024-08-27 | End: 1900-01-01

## 2024-09-30 ENCOUNTER — APPOINTMENT (OUTPATIENT)
Dept: PEDIATRIC ALLERGY IMMUNOLOGY | Facility: CLINIC | Age: 64
End: 2024-09-30
Payer: COMMERCIAL

## 2024-09-30 VITALS — OXYGEN SATURATION: 98 % | HEART RATE: 58 BPM

## 2024-09-30 VITALS — SYSTOLIC BLOOD PRESSURE: 106 MMHG | TEMPERATURE: 97.6 F | DIASTOLIC BLOOD PRESSURE: 67 MMHG

## 2024-09-30 VITALS — BODY MASS INDEX: 22.87 KG/M2 | WEIGHT: 164 LBS

## 2024-09-30 DIAGNOSIS — J34.2 DEVIATED NASAL SEPTUM: ICD-10-CM

## 2024-09-30 DIAGNOSIS — B99.9 UNSPECIFIED INFECTIOUS DISEASE: ICD-10-CM

## 2024-09-30 DIAGNOSIS — K21.9 GASTRO-ESOPHAGEAL REFLUX DISEASE W/OUT ESOPHAGITIS: ICD-10-CM

## 2024-09-30 DIAGNOSIS — J30.9 ALLERGIC RHINITIS, UNSPECIFIED: ICD-10-CM

## 2024-09-30 DIAGNOSIS — J45.40 MODERATE PERSISTENT ASTHMA, UNCOMPLICATED: ICD-10-CM

## 2024-09-30 DIAGNOSIS — J32.9 CHRONIC SINUSITIS, UNSPECIFIED: ICD-10-CM

## 2024-09-30 DIAGNOSIS — D83.9 COMMON VARIABLE IMMUNODEFICIENCY, UNSPECIFIED: ICD-10-CM

## 2024-09-30 DIAGNOSIS — U07.1 COVID-19: ICD-10-CM

## 2024-09-30 PROCEDURE — 90673 RIV3 VACCINE NO PRESERV IM: CPT

## 2024-09-30 PROCEDURE — 99214 OFFICE O/P EST MOD 30 MIN: CPT | Mod: 25

## 2024-09-30 PROCEDURE — 90471 IMMUNIZATION ADMIN: CPT

## 2024-09-30 NOTE — HISTORY OF PRESENT ILLNESS
[de-identified] : 65 y/o with CVID, asthma, chronic sinusitis/AR, GERD/LPR- doing well now with recurrent infections with good trough IgG levels (816 on 7/1/2024) on IVIG Gammagard 30gms q 4weeks over 3hr infusion and he pre-treats with Benadryl and Tylenol. Denies having any side effects on Gammagard.   Had Covid infection 4 weeks ago for which he was treated with Paxlovid, and his Advair was changed to Breo while on Paxlovid - now much better but c/o mild nasal pressure which is worse at home. He feels better when outside of house. He also c/o salty taste in mouth at times. Denies nasal congestion, sneezing, rhinorrhea or sinus pressure/pain  Asthma - doing well on Singulair 10mg QD, Advair 500/50 1 p BID and albuterol PRN (1x a month) - does get worse with URIs  AR/Chronic sinusitis - doing well on Flonase 2p QD, Allegra PRN and saline rinses.   ST 2/2013 - pos to dust and tree pollen.   GERD/LPR - doing well on Protonix 40mg BID and Famotidine 40mg QD - under care of GI.

## 2024-09-30 NOTE — PHYSICAL EXAM
[Alert] : alert [No Acute Distress] : no acute distress [Normal Rate and Effort] : normal respiratory rhythm and effort [Normal Rate] : heart rate was normal  [Skin Intact] : skin intact  [Conjunctival Erythema] : no conjunctival erythema [Pale mucosa] : no pale mucosa [Boggy Nasal Turbinates] : no boggy and/or pale nasal turbinates [Pharyngeal erythema] : no pharyngeal erythema [Clear Rhinorrhea] : no clear rhinorrhea was seen [Wheezing] : no wheezing was heard

## 2024-09-30 NOTE — ASSESSMENT
[FreeTextEntry1] : 64 yr old with CIVD, chronic sinusitis/AR, asthma, GERD/LPR. Had recent Covid infection one month ago treated with Paxlovid - doing better but still c/o mild nasal pressure worse at home and better outside and salty taste in mouth at times. No recent need for antibiotics  CVID - doing very well on IVIG Gammagard 30 gms IV q 4week over 3 hrs - will continue Will check for repeat IgG levels in 6 months.   AR/chronic sinusitis - continue to use Flonase 2sp QD, Allegra PRN and saline rinses. For nasal pressure at home - ?related to AR - consider Allegra QD for next few weeks and see how he does If no better in few weeks pt was advised to call back.   Asthma - continue to use Advair 500/50 1 p BID, Singulair 10mg QD and albuterol PRN  GERD - better on high dose PPI and H2 blockers - continue and f/u with GI  Follow up in 6 months.   Total MD time spent on this encounter was 30 minutes.  This includes time devoted to preparing to see the patient with review of previous medical record, obtaining medical history, performing physical exam, counseling and patient education with patient and family, ordering medications and lab studies, documentation in the medical record and coordination of care.

## 2024-09-30 NOTE — HISTORY OF PRESENT ILLNESS
[de-identified] : 63 y/o with CVID, asthma, chronic sinusitis/AR, GERD/LPR- doing well now with recurrent infections with good trough IgG levels (816 on 7/1/2024) on IVIG Gammagard 30gms q 4weeks over 3hr infusion and he pre-treats with Benadryl and Tylenol. Denies having any side effects on Gammagard.   Had Covid infection 4 weeks ago for which he was treated with Paxlovid, and his Advair was changed to Breo while on Paxlovid - now much better but c/o mild nasal pressure which is worse at home. He feels better when outside of house. He also c/o salty taste in mouth at times. Denies nasal congestion, sneezing, rhinorrhea or sinus pressure/pain  Asthma - doing well on Singulair 10mg QD, Advair 500/50 1 p BID and albuterol PRN (1x a month) - does get worse with URIs  AR/Chronic sinusitis - doing well on Flonase 2p QD, Allegra PRN and saline rinses.   ST 2/2013 - pos to dust and tree pollen.   GERD/LPR - doing well on Protonix 40mg BID and Famotidine 40mg QD - under care of GI.

## 2024-10-14 ENCOUNTER — RX RENEWAL (OUTPATIENT)
Age: 64
End: 2024-10-14

## 2025-01-14 ENCOUNTER — APPOINTMENT (OUTPATIENT)
Dept: PEDIATRIC ALLERGY IMMUNOLOGY | Facility: CLINIC | Age: 65
End: 2025-01-14
Payer: COMMERCIAL

## 2025-01-14 VITALS
OXYGEN SATURATION: 100 % | SYSTOLIC BLOOD PRESSURE: 124 MMHG | BODY MASS INDEX: 22.59 KG/M2 | HEART RATE: 78 BPM | WEIGHT: 162 LBS | DIASTOLIC BLOOD PRESSURE: 82 MMHG

## 2025-01-14 DIAGNOSIS — J32.9 CHRONIC SINUSITIS, UNSPECIFIED: ICD-10-CM

## 2025-01-14 DIAGNOSIS — D83.9 COMMON VARIABLE IMMUNODEFICIENCY, UNSPECIFIED: ICD-10-CM

## 2025-01-14 DIAGNOSIS — J45.40 MODERATE PERSISTENT ASTHMA, UNCOMPLICATED: ICD-10-CM

## 2025-01-14 DIAGNOSIS — J31.0 CHRONIC RHINITIS: ICD-10-CM

## 2025-01-14 DIAGNOSIS — J30.9 ALLERGIC RHINITIS, UNSPECIFIED: ICD-10-CM

## 2025-01-14 PROCEDURE — 99214 OFFICE O/P EST MOD 30 MIN: CPT

## 2025-02-20 ENCOUNTER — RX RENEWAL (OUTPATIENT)
Age: 65
End: 2025-02-20

## 2025-06-10 ENCOUNTER — APPOINTMENT (OUTPATIENT)
Dept: PEDIATRIC ALLERGY IMMUNOLOGY | Facility: CLINIC | Age: 65
End: 2025-06-10
Payer: COMMERCIAL

## 2025-06-10 VITALS
DIASTOLIC BLOOD PRESSURE: 87 MMHG | OXYGEN SATURATION: 96 % | HEART RATE: 58 BPM | TEMPERATURE: 98 F | SYSTOLIC BLOOD PRESSURE: 139 MMHG

## 2025-06-10 PROCEDURE — 95012 NITRIC OXIDE EXP GAS DETER: CPT

## 2025-06-10 PROCEDURE — 99214 OFFICE O/P EST MOD 30 MIN: CPT | Mod: 25

## 2025-06-10 PROCEDURE — 94010 BREATHING CAPACITY TEST: CPT

## 2025-06-10 PROCEDURE — 99204 OFFICE O/P NEW MOD 45 MIN: CPT | Mod: 25

## 2025-06-10 RX ORDER — OLOPATADINE HYDROCHLORIDE 665 UG/1
0.6 SPRAY, METERED NASAL
Qty: 1 | Refills: 11 | Status: ACTIVE | COMMUNITY
Start: 2025-06-10 | End: 1900-01-01

## 2025-06-19 ENCOUNTER — APPOINTMENT (OUTPATIENT)
Dept: PEDIATRIC ALLERGY IMMUNOLOGY | Facility: CLINIC | Age: 65
End: 2025-06-19
Payer: COMMERCIAL

## 2025-06-19 VITALS
TEMPERATURE: 98.2 F | OXYGEN SATURATION: 98 % | HEART RATE: 52 BPM | SYSTOLIC BLOOD PRESSURE: 128 MMHG | DIASTOLIC BLOOD PRESSURE: 74 MMHG

## 2025-06-19 PROCEDURE — 99213 OFFICE O/P EST LOW 20 MIN: CPT

## 2025-06-19 RX ORDER — ALBUTEROL SULFATE AND BUDESONIDE 90; 80 UG/1; UG/1
90-80 AEROSOL, METERED RESPIRATORY (INHALATION) 4 TIMES DAILY
Qty: 1 | Refills: 5 | Status: ACTIVE | COMMUNITY
Start: 2025-06-19 | End: 1900-01-01

## 2025-06-19 RX ORDER — FLUTICASONE FUROATE, UMECLIDINIUM BROMIDE AND VILANTEROL TRIFENATATE 200; 62.5; 25 UG/1; UG/1; UG/1
200-62.5-25 POWDER RESPIRATORY (INHALATION) DAILY
Qty: 1 | Refills: 11 | Status: ACTIVE | COMMUNITY
Start: 2025-06-19 | End: 1900-01-01

## 2025-06-30 ENCOUNTER — LABORATORY RESULT (OUTPATIENT)
Age: 65
End: 2025-06-30

## 2025-07-01 ENCOUNTER — APPOINTMENT (OUTPATIENT)
Dept: OTOLARYNGOLOGY | Facility: CLINIC | Age: 65
End: 2025-07-01
Payer: COMMERCIAL

## 2025-07-01 ENCOUNTER — NON-APPOINTMENT (OUTPATIENT)
Age: 65
End: 2025-07-01

## 2025-07-01 VITALS
HEART RATE: 49 BPM | WEIGHT: 168 LBS | DIASTOLIC BLOOD PRESSURE: 87 MMHG | BODY MASS INDEX: 23.52 KG/M2 | HEIGHT: 71 IN | SYSTOLIC BLOOD PRESSURE: 147 MMHG

## 2025-07-01 PROCEDURE — 99213 OFFICE O/P EST LOW 20 MIN: CPT | Mod: 25

## 2025-07-01 PROCEDURE — 31231 NASAL ENDOSCOPY DX: CPT

## 2025-07-03 ENCOUNTER — RX CHANGE (OUTPATIENT)
Age: 65
End: 2025-07-03

## 2025-07-10 ENCOUNTER — APPOINTMENT (OUTPATIENT)
Dept: PEDIATRIC ALLERGY IMMUNOLOGY | Facility: CLINIC | Age: 65
End: 2025-07-10

## 2025-07-21 ENCOUNTER — APPOINTMENT (OUTPATIENT)
Dept: PEDIATRIC ALLERGY IMMUNOLOGY | Facility: CLINIC | Age: 65
End: 2025-07-21
Payer: COMMERCIAL

## 2025-07-21 VITALS
DIASTOLIC BLOOD PRESSURE: 80 MMHG | HEART RATE: 52 BPM | OXYGEN SATURATION: 99 % | TEMPERATURE: 97.6 F | SYSTOLIC BLOOD PRESSURE: 127 MMHG

## 2025-07-21 DIAGNOSIS — J45.40 MODERATE PERSISTENT ASTHMA, UNCOMPLICATED: ICD-10-CM

## 2025-07-21 DIAGNOSIS — J30.89 OTHER ALLERGIC RHINITIS: ICD-10-CM

## 2025-07-21 DIAGNOSIS — J30.1 ALLERGIC RHINITIS DUE TO POLLEN: ICD-10-CM

## 2025-07-21 DIAGNOSIS — D83.9 COMMON VARIABLE IMMUNODEFICIENCY, UNSPECIFIED: ICD-10-CM

## 2025-07-21 PROCEDURE — 94010 BREATHING CAPACITY TEST: CPT

## 2025-07-21 PROCEDURE — 99213 OFFICE O/P EST LOW 20 MIN: CPT | Mod: 25
